# Patient Record
Sex: MALE | Race: WHITE | Employment: FULL TIME | URBAN - METROPOLITAN AREA
[De-identification: names, ages, dates, MRNs, and addresses within clinical notes are randomized per-mention and may not be internally consistent; named-entity substitution may affect disease eponyms.]

---

## 2022-06-06 ENCOUNTER — HOSPITAL ENCOUNTER (OUTPATIENT)
Dept: PREADMISSION TESTING | Age: 57
Discharge: HOME | End: 2022-06-06
Payer: COMMERCIAL

## 2022-06-06 VITALS
TEMPERATURE: 98.4 F | HEART RATE: 100 BPM | HEIGHT: 68 IN | DIASTOLIC BLOOD PRESSURE: 83 MMHG | WEIGHT: 192.46 LBS | SYSTOLIC BLOOD PRESSURE: 164 MMHG | BODY MASS INDEX: 29.17 KG/M2

## 2022-06-06 LAB
ABO + RH BLD: NORMAL
ANION GAP SERPL CALC-SCNC: 3 MMOL/L (ref 5–15)
APPEARANCE UR: CLEAR
ATRIAL RATE: 111 BPM
BACTERIA URNS QL MICRO: NEGATIVE /HPF
BILIRUB UR QL: NEGATIVE
BLOOD GROUP ANTIBODIES SERPL: NORMAL
BUN SERPL-MCNC: 11 MG/DL (ref 6–20)
BUN/CREAT SERPL: 10 (ref 12–20)
CALCIUM SERPL-MCNC: 9 MG/DL (ref 8.5–10.1)
CALCULATED P AXIS, ECG09: 74 DEGREES
CALCULATED R AXIS, ECG10: 62 DEGREES
CALCULATED T AXIS, ECG11: 30 DEGREES
CHLORIDE SERPL-SCNC: 109 MMOL/L (ref 97–108)
CO2 SERPL-SCNC: 30 MMOL/L (ref 21–32)
COLOR UR: NORMAL
CREAT SERPL-MCNC: 1.12 MG/DL (ref 0.7–1.3)
DIAGNOSIS, 93000: NORMAL
EPITH CASTS URNS QL MICRO: NORMAL /LPF
ERYTHROCYTE [DISTWIDTH] IN BLOOD BY AUTOMATED COUNT: 11.4 % (ref 11.5–14.5)
EST. AVERAGE GLUCOSE BLD GHB EST-MCNC: 94 MG/DL
GLUCOSE SERPL-MCNC: 91 MG/DL (ref 65–100)
GLUCOSE UR STRIP.AUTO-MCNC: NEGATIVE MG/DL
HBA1C MFR BLD: 4.9 % (ref 4–5.6)
HCT VFR BLD AUTO: 47.7 % (ref 36.6–50.3)
HGB BLD-MCNC: 15.7 G/DL (ref 12.1–17)
HGB UR QL STRIP: NEGATIVE
HYALINE CASTS URNS QL MICRO: NORMAL /LPF (ref 0–5)
INR PPP: 1 (ref 0.9–1.1)
KETONES UR QL STRIP.AUTO: NEGATIVE MG/DL
LEUKOCYTE ESTERASE UR QL STRIP.AUTO: NEGATIVE
MCH RBC QN AUTO: 35 PG (ref 26–34)
MCHC RBC AUTO-ENTMCNC: 32.9 G/DL (ref 30–36.5)
MCV RBC AUTO: 106.2 FL (ref 80–99)
NITRITE UR QL STRIP.AUTO: NEGATIVE
NRBC # BLD: 0 K/UL (ref 0–0.01)
NRBC BLD-RTO: 0 PER 100 WBC
P-R INTERVAL, ECG05: 132 MS
PH UR STRIP: 5.5 [PH] (ref 5–8)
PLATELET # BLD AUTO: 265 K/UL (ref 150–400)
PMV BLD AUTO: 10.4 FL (ref 8.9–12.9)
POTASSIUM SERPL-SCNC: 3.9 MMOL/L (ref 3.5–5.1)
PROT UR STRIP-MCNC: NEGATIVE MG/DL
PROTHROMBIN TIME: 10.2 SEC (ref 9–11.1)
Q-T INTERVAL, ECG07: 342 MS
QRS DURATION, ECG06: 78 MS
QTC CALCULATION (BEZET), ECG08: 465 MS
RBC # BLD AUTO: 4.49 M/UL (ref 4.1–5.7)
RBC #/AREA URNS HPF: NORMAL /HPF (ref 0–5)
SODIUM SERPL-SCNC: 142 MMOL/L (ref 136–145)
SP GR UR REFRACTOMETRY: 1.02 (ref 1–1.03)
SPECIMEN EXP DATE BLD: NORMAL
UA: UC IF INDICATED,UAUC: NORMAL
UROBILINOGEN UR QL STRIP.AUTO: 0.2 EU/DL (ref 0.2–1)
VENTRICULAR RATE, ECG03: 111 BPM
WBC # BLD AUTO: 2.3 K/UL (ref 4.1–11.1)
WBC URNS QL MICRO: NORMAL /HPF (ref 0–4)

## 2022-06-06 PROCEDURE — 93005 ELECTROCARDIOGRAM TRACING: CPT

## 2022-06-06 PROCEDURE — 85027 COMPLETE CBC AUTOMATED: CPT

## 2022-06-06 PROCEDURE — 80048 BASIC METABOLIC PNL TOTAL CA: CPT

## 2022-06-06 PROCEDURE — 85610 PROTHROMBIN TIME: CPT

## 2022-06-06 PROCEDURE — 86900 BLOOD TYPING SEROLOGIC ABO: CPT

## 2022-06-06 PROCEDURE — 81001 URINALYSIS AUTO W/SCOPE: CPT

## 2022-06-06 PROCEDURE — 83036 HEMOGLOBIN GLYCOSYLATED A1C: CPT

## 2022-06-06 RX ORDER — DULOXETIN HYDROCHLORIDE 30 MG/1
30 CAPSULE, DELAYED RELEASE ORAL EVERY MORNING
COMMUNITY

## 2022-06-06 RX ORDER — ALBUTEROL SULFATE 90 UG/1
AEROSOL, METERED RESPIRATORY (INHALATION) AS NEEDED
COMMUNITY

## 2022-06-06 RX ORDER — IBUPROFEN 200 MG
TABLET ORAL AS NEEDED
COMMUNITY
End: 2022-07-19

## 2022-06-06 RX ORDER — PANTOPRAZOLE SODIUM 40 MG/1
40 TABLET, DELAYED RELEASE ORAL EVERY EVENING
COMMUNITY

## 2022-06-06 RX ORDER — TRAZODONE HYDROCHLORIDE 150 MG/1
150 TABLET ORAL
COMMUNITY

## 2022-06-06 RX ORDER — TOCILIZUMAB 20 MG/ML
4 INJECTION, SOLUTION, CONCENTRATE INTRAVENOUS
Status: ON HOLD | COMMUNITY
End: 2022-07-18 | Stop reason: SDUPTHER

## 2022-06-06 RX ORDER — LISINOPRIL 10 MG/1
10 TABLET ORAL EVERY EVENING
COMMUNITY

## 2022-06-06 RX ORDER — FAMOTIDINE 20 MG/1
20 TABLET, FILM COATED ORAL AS NEEDED
COMMUNITY

## 2022-06-06 RX ORDER — DULOXETIN HYDROCHLORIDE 60 MG/1
60 CAPSULE, DELAYED RELEASE ORAL EVERY EVENING
COMMUNITY

## 2022-06-06 RX ORDER — LEFLUNOMIDE 10 MG/1
10 TABLET ORAL EVERY EVENING
COMMUNITY

## 2022-06-06 RX ORDER — PREDNISONE 5 MG/1
5 TABLET ORAL AS NEEDED
Status: ON HOLD | COMMUNITY
End: 2022-07-18

## 2022-06-06 RX ORDER — LOPERAMIDE HCL 2 MG
2 TABLET ORAL AS NEEDED
COMMUNITY

## 2022-06-06 NOTE — PERIOP NOTES
[address] INSTRUCTIONS  ORTHOPAEDIC    Surgery Date:   7/11/22    Your surgeon's office or [facility] staff will call you between 4 PM- 8 PM the day before surgery with your arrival time. If your surgery is on a Monday, you will receive a call the preceding Friday. 1. Please report to [facility] Patient Access/Admitting on the 1st floor. Bring your insurance card, photo identification, and any copayment (if applicable). 2. If you are going home the same day of your surgery, you must have a responsible adult to drive you home. You need to have a responsible adult to stay with you the first 24 hours after surgery and you should not drive a car for 24 hours following your surgery. 3. Do NOT eat any solid foods after midnight the night before surgery including candy, mints or gum. You may drink clear liquids from midnight until 1 hour prior to arrival time. You may drink up to 12 ounces at one time every 4 hours. 4. Do NOT drink alcohol or smoke 24 hours before surgery. STOP smoking for 14 days prior as it helps with breathing and healing after surgery. 5. If your arrival time is 3pm or later, you may eat a light breakfast before 8am (toast, bagel-no butter, black coffee, plain tea, fruit juice-no pulp) Please note special instructions, if applicable, below for medications. 6. If you are being admitted to the hospital,please leave personal belongings/luggage in your car until you have an assigned hospital room number. 7. Please wear comfortable clothes. Wear your glasses instead of contacts. We ask that all money, jewelry and valuables be left at home. Wear no make up, particularly mascara, the day of surgery. 8.  All body piercings, rings, and jewelry need to be removed and left at home. Please remove any nail polish or artificial nails from your fingernails. Please wear your hair loose or down. Please no pony-tails, buns, or any metal hair accessories.  If you shower the morning of surgery, please do not apply any lotions or powders afterwards. You may wear deodorant. Do not shave any body area within 24 hours of your surgery. 9. Please follow all instructions to avoid any potential surgical cancellation. 10. Should your physical condition change, (i.e. fever, cold, flu, etc.) please notify your surgeon as soon as possible. 11. It is important to be on time. If a situation occurs where you may be delayed, please call:  [phone] / [phone] on the day of surgery. 12. The Preadmission Testing staff can be reached at [phone]. 13. Special instructions: NONE    Current Outpatient Medications   Medication Sig    tocilizumab (Actemra) 80 mg/4 mL (20 mg/mL) soln injection 4 mg/kg by IntraVENous route every month.  leflunomide (Arava) 10 mg tablet Take 10 mg by mouth every evening.  DULoxetine (CYMBALTA) 60 mg capsule Take 60 mg by mouth every evening.  lisinopriL (PRINIVIL, ZESTRIL) 10 mg tablet Take 10 mg by mouth every evening.  DULoxetine (Cymbalta) 30 mg capsule Take 30 mg by mouth Every morning.  traZODone (DESYREL) 150 mg tablet Take 150 mg by mouth nightly.  albuterol (PROVENTIL HFA, VENTOLIN HFA, PROAIR HFA) 90 mcg/actuation inhaler Take  by inhalation as needed for Wheezing.  medical marijuana Take 1 Each by mouth daily as needed for Pain.  pantoprazole (PROTONIX) 40 mg tablet Take 40 mg by mouth every evening.  predniSONE (DELTASONE) 5 mg tablet Take 5 mg by mouth as needed.  loperamide (IMMODIUM) 2 mg tablet Take 2 mg by mouth as needed for Diarrhea.  ibuprofen (MOTRIN) 200 mg tablet Take  by mouth as needed for Pain.  famotidine (PEPCID) 20 mg tablet Take 20 mg by mouth as needed for Heartburn. No current facility-administered medications for this encounter. 1. YOU MUST ONLY TAKE THESE MEDICATIONS THE MORNING OF SURGERY WITH A SIP OF WATER: CYMBALTA, PANTOPRAZOLE.   2. MEDICATIONS TO TAKE THE MORNING OF SURGERY ONLY IF NEEDED: INHALER IF NEEDED  3. HOLD these prescription medications BEFORE Surgery: NONE  4. Ask your surgeon/prescribing physician about when/if to STOP taking these medications: THC  5. Stop any non-steroidal anti-inflammatory drugs (i.e. Ibuprofen, Naproxen, Advil, Aleve) 3 days before surgery. You may take Tylenol. STOP all vitamins and herbal supplements 1 week prior to  Surgery. 6. STOP IBUPROFEN ON 7/8/22  7. If you are currently taking Plavix, Coumadin, or any other blood-thinning/anticoagulant medication contact your prescribing physician for instructions. Preventing Infections Before and After - Your Surgery    IMPORTANT INSTRUCTIONS    You play an important role in your health and preparation for surgery. To reduce the germs on your skin you will need to shower with CHG soap (Chorhexidine gluconate 4%) two times before surgery. CHG soap (Hibiclens, Hex-A-Clens or store brand)   CHG soap will be provided at your Preadmission Testing (PAT) appointment.  If you do not have a PAT appointment before surgery, you may arrange to  CHG soap from our office or purchase CHG soap at a pharmacy, grocery or department store.  You need to purchase TWO 4 ounce bottles to use for your 2 showers. Steps to follow:  1. Wash your hair with your normal shampoo and your body with regular soap and rinse well to remove shampoo and soap from your skin. 2. Wet a clean washcloth and turn off the shower. 3. Put CHG soap on washcloth and apply to your entire body from the neck down. Do not use on your head, face or private parts(genitals). Do not use CHG soap on open sores, wounds or areas of skin irritation. 4. Wash you body gently for 5 minutes. Do not wash your skin too hard. This soap does not create lather. Pay special attention to your underarms and from your belly button to your feet. 5. Turn the shower back on and rinse well to get CHG soap off your body. 6. Pat your skin dry with a clean, dry towel. Do not apply lotions or moisturizer. 7. Put on clean clothes and sleep on fresh bed sheets and do not allow pets to sleep with you. Shower with CHG soap 2 times before your surgery   The evening before your surgery   The morning of your surgery      Tips to help prevent infections after your surgery:  1. Protect your surgical wound from germs:  ? Hand washing is the most important thing you and your caregivers can do to prevent infections. ? Keep your bandage clean and dry! ? Do not touch your surgical wound. 2. Use clean, freshly washed towels and washcloths every time you shower; do not share bath linens with others. 3. Until your surgical wound is healed, wear clothing and sleep on bed linens each day that are clean and freshly washed. 4. Do not allow pets to sleep in your bed with you or touch your surgical wound. 5. Do not smoke - smoking delays wound healing. This may be a good time to stop smoking. 6. If you have diabetes, it is important for you to manage your blood sugar levels properly before your surgery as well as after your surgery. Poorly managed blood sugar levels slow down wound healing and prevent you from healing completely. Prevention of Infection  Testing for Staphylococcus aureus on your skin before surgery    Staphylococcus aureus (staph) is a common bacteria that is found on the body. It normally does not cause infection on healthy skin. Before surgery, you will be tested to see if you have staph by swabbing the inside of your nose. When you have an incision with surgery, the goal is to protect that incision from infection. Removal of the staph bacteria before surgery can decrease the risk of a surgical site infection. If your nose swab is positive for staph you will be called. Your treatment will include 2 steps:   Prescription for Mupirocin ointment to be used in each nostril twice a day for 5 days.    Showering with Chlorhexidine (CHG) liquid soap for 5 days prior to surgery. How to use Mupirocin ointment in your nose  1.  the prescription from your pharmacy. You will receive a large tube of ointment which will be big enough for all of your treatments. You will apply this ointment to each nostril 2 times a day for 5 days. 2. Wash your hands with  gel or soap and water for 20 seconds before using ointment. 3. Place a pea-sized amount of ointment on a cotton Q-tip. 4. Apply ointment just inside of each nostril with the Q-tip. Do not push Q-tip or ointment deep inside you nose. 5. Press your nostrils together and massage for a few seconds. 6. Wash your hands with  gel or soap and water after you are finished. 7. Do not get ointment near your eyes. If it gets into your eyes, rinse them with cool water. 8. If you need to use nasal spray, clean the tip of the bottle with alcohol before use and do not use both at the same time. 9. If you are scheduled for COVID testing during the 5 days, do NOT apply morning dose until after the COVID test has been performed. How to use Chlorhexidine (CHG) 4% liquid soap  1. Purchase an 8 ounce bottle of CHG liquid soap (Chlorhexidine 4%, Hibiclens, Hex-A-Clens or store brand) at a pharmacy or grocery store. 2. Wash your hair with your normal shampoo and your body with regular soap and rinse well to remove shampoo and soap from your skin. 3. Wet a clean washcloth and turn off the shower. 4. Put CHG soap on washcloth and apply to your entire body from the neck down. Do not use on your head, face or private parts(genitals). Do not use CHG soap on open sores, wounds or areas of skin irritation. 5. Wash your body gently for 5 minutes. Do not wash your skin too hard. This soap does not create lather. Pay special attention to your underarms and from your belly button to your feet. 6. Turn the shower back on and rinse well to get CHG soap off your body. 7. Pat your skin dry with a clean, dry towel.  Do not apply lotions or moisturizer. 8. Put on clean clothes and sleep on fresh bed sheets the night before surgery. Do not allow pets to sleep with you. Eating and Drinking Before Surgery     You may eat a regular dinner at the usual time on the day before your surgery.  Do NOT eat any solid foods after midnight unless your arrival time at the hospital is 3pm or later.  You may drink clear liquids only from 12 midnight until 1 hours prior to your arrival time at the hospital on the day of your surgery. Do NOT drink alcohol.  Clear liquids include:  o Water  o Fruit juices without pulp( i.e. apple juice)  o Carbonated beverages  o Black coffee (no cream/milk)  o Tea (no cream/milk)  o Gatorade   You may drink up to 12-16 ounces at one time every 4 hours between the hours of midnight and 1 hour before your arrival time at the hospital. Example- if your arrival time at the hospital is 6am, you may drink 12-16 ounces of clear liquids no later than 5am.   If your arrival time at the hospital is 3pm or later, you may eat a light breakfast before 8am.   A light breakfast includes:  o Toast or bagel (no butter)  o Black coffee (no cream/milk)  o Tea (no cream/milk)  o Fruit juices without pulp ( i.e. apple juice)  o Do NOT eat meat, eggs, vegetables or fruit   If you have any questions, please contact your surgeon's office. Patient Information Regarding COVID Restrictions    Day of Procedure     Please park in the parking deck or any designated visitor parking lot.  Enter the facility through the [facility] of the [facility].   On the day of surgery, please provide the cell phone number of the person who will be waiting for you to the Patient Access representative at the time of registration.  Please wear a mask on the day of your procedure.  We are now allowing two designated visitors per stay. Pediatric patients may have 2 designated visitors.  These two people may come in with you on the day of your procedure.  No visitors under the age of 13.  The designated visitor must also wear a mask.  Once your procedure and the immediate recovery period is completed, a nurse in the recovery area will contact your designated visitor to inform them of your room number or to otherwise review other pertinent information regarding your care.  Social distancing practices are to be adhered to in waiting areas and the cafeteria. The patient was contacted in person. He verbalized understanding of all instructions does not  need reinforcement.

## 2022-06-07 LAB
BACTERIA SPEC CULT: NORMAL
BACTERIA SPEC CULT: NORMAL
SERVICE CMNT-IMP: NORMAL

## 2022-06-08 RX ORDER — MUPIROCIN 20 MG/G
OINTMENT TOPICAL 2 TIMES DAILY
Qty: 22 G | Refills: 0 | Status: SHIPPED | OUTPATIENT
Start: 2022-06-08 | End: 2022-06-13

## 2022-06-08 NOTE — PERIOP NOTES
PAT Nurse Practitioner   Pre-Operative Chart Review/Assessment:-ORTHOPEDIC                Patient Name:  Magdaleno Garg                                                           Age:   62 y.o.    :  1965     Today's Date:  2022     Date of PAT:   2022      Date of Surgery:    2022      Procedure(s):  Right Total Knee Arthroplasty     Surgeon:   Dr. Sarahi Sherman:      1)  Medical Clearance:  Jeyson Gabriel NP      2)  Cardiac Clearance:  EKG and METs reviewed and sent to anesthesia for review. PAT EKG: there are no previous tracings available for comparison, nonspecific ST and T waves changes, sinus tachycardia(111). EKG from 22 reviewed by Dr. David Schneider, anesthesiologist. No previous EKG for comparison. Planned procedure, PMHx, functional status reviewed. Pt ok to proceed with planned procedure per Dr. David Schneider. 3)  Diabetic Treatment Consult:  Not indicated. A1c-4.9      4)  Sleep Apnea evaluation:   Not indicated. OUMOU Score 3.       5) Treatment for MRSA/Staph Aureus:  +MSSA.  Tx w/ mupirocin      6) Additional Concerns:  HTN, GERD, RA, depression                Vital Signs:         Vitals:    22 0833   BP: (!) 164/83   Pulse: 100   Temp: 98.4 °F (36.9 °C)   Weight: 87.3 kg (192 lb 7.4 oz)   Height: 5' 8\" (1.727 m)            ____________________________________________  PAST MEDICAL HISTORY  Past Medical History:   Diagnosis Date    Chronic pain     Depression     GERD (gastroesophageal reflux disease)     Hypertension     Rheumatoid arthritis ([address]       ____________________________________________  PAST SURGICAL HISTORY  Past Surgical History:   Procedure Laterality Date    HX GI      COLONOSCOPY    HX KNEE ARTHROSCOPY Left 2022    HX ORTHOPAEDIC Right     KNEE RECONSTRUCTION    HX ORTHOPAEDIC Right      KNEE REVISION      ____________________________________________  HOME MEDICATIONS  Current Outpatient Medications   Medication Sig  tocilizumab (Actemra) 80 mg/4 mL (20 mg/mL) soln injection 4 mg/kg by IntraVENous route every month.  leflunomide (Arava) 10 mg tablet Take 10 mg by mouth every evening.  DULoxetine (CYMBALTA) 60 mg capsule Take 60 mg by mouth every evening.  lisinopriL (PRINIVIL, ZESTRIL) 10 mg tablet Take 10 mg by mouth every evening.  DULoxetine (Cymbalta) 30 mg capsule Take 30 mg by mouth Every morning.  traZODone (DESYREL) 150 mg tablet Take 150 mg by mouth nightly.  albuterol (PROVENTIL HFA, VENTOLIN HFA, PROAIR HFA) 90 mcg/actuation inhaler Take  by inhalation as needed for Wheezing.  medical marijuana Take 1 Each by mouth daily as needed for Pain.  pantoprazole (PROTONIX) 40 mg tablet Take 40 mg by mouth every evening.  predniSONE (DELTASONE) 5 mg tablet Take 5 mg by mouth as needed.  loperamide (IMMODIUM) 2 mg tablet Take 2 mg by mouth as needed for Diarrhea.  ibuprofen (MOTRIN) 200 mg tablet Take  by mouth as needed for Pain.  famotidine (PEPCID) 20 mg tablet Take 20 mg by mouth as needed for Heartburn. No current facility-administered medications for this encounter.      ____________________________________________  ALLERGIES  Allergies   Allergen Reactions    Penicillins Rash     NO HOSPITALIZATION NEEDED      ____________________________________________  SOCIAL HISTORY  Social History     Tobacco Use    Smoking status: Never Smoker    Smokeless tobacco: Never Used   Substance Use Topics    Alcohol use:  Yes     Alcohol/week: 14.0 standard drinks     Types: 14 Glasses of wine per week      ____________________________________________   Internal Administration   First Dose COVID-19, Rosario Weiner, Primary or Immunocompromised Series, MRNA, PF, 100mcg/0.5mL  02/05/2021   Second Dose COVID-19, Rosario Weiner, Primary or Immunocompromised Series, MRNA, PF, 100mcg/0.5mL  03/05/2021     Labs:     Hospital Outpatient Visit on 06/06/2022   Component Date Value Ref Range Status    Sodium 06/06/2022 142  136 - 145 mmol/L Final    Potassium 06/06/2022 3.9  3.5 - 5.1 mmol/L Final    Chloride 06/06/2022 109* 97 - 108 mmol/L Final    CO2 06/06/2022 30  21 - 32 mmol/L Final    Anion gap 06/06/2022 3* 5 - 15 mmol/L Final    Glucose 06/06/2022 91  65 - 100 mg/dL Final    BUN 06/06/2022 11  6 - 20 MG/DL Final    Creatinine 06/06/2022 1.12  0.70 - 1.30 MG/DL Final    BUN/Creatinine ratio 06/06/2022 10* 12 - 20   Final    GFR est AA 06/06/2022 >60  >60 ml/min/1.73m2 Final    GFR est non-AA 06/06/2022 >60  >60 ml/min/1.73m2 Final    Estimated GFR is calculated using the IDMS-traceable Modification of Diet in Renal Disease (MDRD) Study equation, reported for both  Americans (GFRAA) and non- Americans (GFRNA), and normalized to 1.73m2 body surface area. The physician must decide which value applies to the patient.  Calcium 06/06/2022 9.0  8.5 - 10.1 MG/DL Final    WBC 06/06/2022 2.3* 4.1 - 11.1 K/uL Final    RBC 06/06/2022 4.49  4. 10 - 5.70 M/uL Final    HGB 06/06/2022 15.7  12.1 - 17.0 g/dL Final    HCT 06/06/2022 47.7  36.6 - 50.3 % Final    MCV 06/06/2022 106.2* 80.0 - 99.0 FL Final    MCH 06/06/2022 35.0* 26.0 - 34.0 PG Final    MCHC 06/06/2022 32.9  30.0 - 36.5 g/dL Final    RDW 06/06/2022 11.4* 11.5 - 14.5 % Final    PLATELET 38/72/4124 906  150 - 400 K/uL Final    MPV 06/06/2022 10.4  8.9 - 12.9 FL Final    NRBC 06/06/2022 0.0  0  WBC Final    ABSOLUTE NRBC 06/06/2022 0.00  0.00 - 0.01 K/uL Final    Crossmatch Expiration 06/06/2022 06/20/2022,2359   Final    ABO/Rh(D) 06/06/2022 O POSITIVE   Final    Antibody screen 06/06/2022 NEG   Final    INR 06/06/2022 1.0  0.9 - 1.1   Final    A single therapeutic range for Vit K antagonists may not be optimal for all indications - see June, 2008 issue of Chest, American College of Chest Physicians Evidence-Based Clinical Practice Guidelines, 8th Edition.     Prothrombin time 06/06/2022 10.2  9.0 - 11.1 sec Final    Color 06/06/2022 YELLOW/STRAW    Final    Color Reference Range: Straw, Yellow or Dark Yellow    Appearance 06/06/2022 CLEAR  CLEAR   Final    Specific gravity 06/06/2022 1.019  1.003 - 1.030   Final    pH (UA) 06/06/2022 5.5  5.0 - 8.0   Final    Protein 06/06/2022 Negative  NEG mg/dL Final    Glucose 06/06/2022 Negative  NEG mg/dL Final    Ketone 06/06/2022 Negative  NEG mg/dL Final    Bilirubin 06/06/2022 Negative  NEG   Final    Blood 06/06/2022 Negative  NEG   Final    Urobilinogen 06/06/2022 0.2  0.2 - 1.0 EU/dL Final    Nitrites 06/06/2022 Negative  NEG   Final    Leukocyte Esterase 06/06/2022 Negative  NEG   Final    UA:UC IF INDICATED 06/06/2022 CULTURE NOT INDICATED BY UA RESULT  CNI   Final    WBC 06/06/2022 0-4  0 - 4 /hpf Final    RBC 06/06/2022 0-5  0 - 5 /hpf Final    Epithelial cells 06/06/2022 FEW  FEW /lpf Final    Epithelial cell category consists of squamous cells and /or transitional urothelial cells. Renal tubular cells, if present, are separately identified as such.     Bacteria 06/06/2022 Negative  NEG /hpf Final    Hyaline cast 06/06/2022 0-2  0 - 5 /lpf Final    Ventricular Rate 06/06/2022 111  BPM Final    Atrial Rate 06/06/2022 111  BPM Final    P-R Interval 06/06/2022 132  ms Final    QRS Duration 06/06/2022 78  ms Final    Q-T Interval 06/06/2022 342  ms Final    QTC Calculation (Bezet) 06/06/2022 465  ms Final    Calculated P Axis 06/06/2022 74  degrees Final    Calculated R Axis 06/06/2022 62  degrees Final    Calculated T Axis 06/06/2022 30  degrees Final    Diagnosis 06/06/2022    Final                    Value:Sinus tachycardia  Possible Left atrial enlargement  Nonspecific ST and T wave abnormality  Abnormal ECG  No previous ECGs available  Confirmed by Yessy Cardnoa M.D., Stanley Huang ([id number]) on 6/6/2022 3:27:07 PM      Hemoglobin A1c 06/06/2022 4.9  4.0 - 5.6 % Final    Comment: NEW METHOD  PLEASE NOTE NEW REFERENCE RANGE  (NOTE)  HbA1C Interpretive Ranges  <5.7 Normal  5.7 - 6.4         Consider Prediabetes  >6.5              Consider Diabetes      Est. average glucose 06/06/2022 94  mg/dL Final    Special Requests: 06/06/2022 NO SPECIAL REQUESTS    Final    Culture result: 06/06/2022 MRSA NOT PRESENT. Apparent Staphylococus aureus (not MRSA noted). Final       Skin:     Denies open wounds, cuts, sores, rashes or other areas of concern in PAT assessment.           Ronal Kitchen NP

## 2022-06-08 NOTE — PERIOP NOTES
PC to pt, full name and  verified, regarding positive nasal cx (MSSA) and need to start Mupirocin ointment BID x 5 days to B nostrils starting today and bathe with CHG soap for 5 days prior to surgery. Pt in [facility] until  and will start treatment when he returns. Allergies and pharmacy of choice reviewed. Rx escribed to pt's pharmacy of choice. PTA medlist updated. Surgeon and PCP notified of positive culture and treatment. PRESCRIPTION:    MUPIROCIN 2% OINTMENT  QUANTITY:  #22 GRAMS  REFILLS: NONE    Apply 0.25 g (small pea-sized amount) to both nostrils twice a day for five days.     Dhruv Palomo, NP

## 2022-07-07 RX ORDER — MUPIROCIN 20 MG/G
OINTMENT TOPICAL 2 TIMES DAILY
Qty: 22 G | Refills: 0 | Status: SHIPPED | OUTPATIENT
Start: 2022-07-07 | End: 2022-07-12

## 2022-07-13 NOTE — H&P
Subjective:   Chief Complaint: Pain of the Right Knee and Pain of the Left Knee     HPI: Kyler Acevedo is a 64 y.o. male with rheumatoid arthritis presents today for evaluation and treatment of chronic bilateral knee pain. He has a history of an ACL rupture as a child that was not surgically fixed at the time. Years later he had arthroscopic meniscectomies of his bilateral knees. He eventually had an ACL reconstruction of his right knee with his hamstring autograft and LCL reconstruction in the mid 1990s that subsequently failed. The bilateral knee pain has limited his ability to perform activities of daily living.     Ambulating with no assistance. Currently on biologic for his RA. Objective:      Vitals:      Height: 5' 8\"   Weight: 198 lb   Body mass index is 30.11 kg/m².     Constitutional:  No acute distress. Well nourished. Well developed. Eyes:  Sclera are nonicteric. Respiratory:  No labored breathing. Cardiovascular:  No marked edema. Skin:  No marked skin ulcers. Neurological:  No marked sensory loss noted. Psychiatric: Alert and oriented x3.     Musculoskeletal :  Patient has full range of the bilateral hips with no pain on motion. Bilateral knees:   Full range of motion of the bilateral knees with no significant pain on motion. Positive lachman and mild laxity to valgus stress of the right knee. No pedal edema. Skin healthy and intact. No apparent muscle atrophy.     Radiographs:     Order: XR KNEE 3 VW RIGHT - Indication: Post-traumatic osteoarthritis of  right knee  Order: XR KNEE 3 VW LEFT - Indication: Primary osteoarthritis of left knee        X-ray knee left 3 views ([id number])     Result Date: 2/15/2022  Views: Standing AP, [place], [place].     Impression: Large calcified loose body in the suprapatellar pouch with moderate tricompartmental degenerative changes.   Apparent small metallic clip in the proximal tibia     X-ray knee right 3 views ([id number])     Result Date: 2/15/2022  Views: Standing AP, [place].     Impression: Moderate-severe tricompartmental degenerative changes with calcified loose bodies around the posterior aspect of the knee. Metallic inference screws      Assessment:      1. Post-traumatic osteoarthritis of right knee   2. Primary osteoarthritis of left knee   3. Unilateral primary osteoarthritis, right knee       Plan:   XR of the left knee taken today shows large calcified loose body in the suprapatellar pouch with moderate tricompartmental degenerative changes. Apparent small metallic clip in the proximal tibia     XR of the right knee taken today shows moderate-severe tricompartmental degenerative changes with calcified loose bodies around the posterior aspect of the knee. Metallic inference screws. I discussed the diagnosis of bilateral knee osteoarthritis. We discussed treatment options including continued conservative management vs total knee replacement surgery as well as the pros and cons of each. He reports he has failed conservative management and would like to discuss surgery further. We discussed total knee replacement surgery at length including expected outcomes and post-op recovery as well as risks and complications, specifically DVT, PE, infection, and nerve injury. After a lengthy discussion, the patient desires to proceed with surgery. We will schedule surgery at the patient's convenience. Follow-up for scheduled RIGHT total knee replacement. Surgery should be scheduled at the end of biologic dose regimen.     DATE OF SURGERY UPDATE:  Past Medical History:   Diagnosis Date    Chronic pain     Depression     GERD (gastroesophageal reflux disease)     Hypertension     Rheumatoid arthritis ([address]      Past Surgical History:   Procedure Laterality Date    HX GI      COLONOSCOPY    HX KNEE ARTHROSCOPY Left 04/2022    HX ORTHOPAEDIC Right 1996    KNEE RECONSTRUCTION    HX ORTHOPAEDIC Right 2000     KNEE REVISION     Family History   Problem Relation Age of Onset    No Known Problems Mother     No Known Problems Father     Cancer Sister         BREAST    No Known Problems Brother     Anesth Problems Neg Hx      Social History     Tobacco Use    Smoking status: Never Smoker    Smokeless tobacco: Never Used   Substance Use Topics    Alcohol use: Yes     Alcohol/week: 14.0 standard drinks     Types: 14 Glasses of wine per week      Prior to Admission Medications   Prescriptions Last Dose Informant Patient Reported? Taking? DULoxetine (CYMBALTA) 60 mg capsule 2022 at Unknown time  Yes Yes   Sig: Take 60 mg by mouth every evening. DULoxetine (Cymbalta) 30 mg capsule 2022 at Unknown time  Yes Yes   Sig: Take 30 mg by mouth Every morning. albuterol (PROVENTIL HFA, VENTOLIN HFA, PROAIR HFA) 90 mcg/actuation inhaler 2022 at 0530  Yes Yes   Sig: Take  by inhalation as needed for Wheezing. famotidine (PEPCID) 20 mg tablet 2022 at Unknown time  Yes Yes   Sig: Take 20 mg by mouth as needed for Heartburn. ibuprofen (MOTRIN) 200 mg tablet 2022 at Unknown time  Yes Yes   Sig: Take  by mouth as needed for Pain.   leflunomide (Arava) 10 mg tablet 2022 at Unknown time  Yes Yes   Sig: Take 10 mg by mouth every evening. lisinopriL (PRINIVIL, ZESTRIL) 10 mg tablet 2022 at Unknown time  Yes Yes   Sig: Take 10 mg by mouth every evening. loperamide (IMMODIUM) 2 mg tablet 7/15/2022 at Unknown time  Yes Yes   Sig: Take 2 mg by mouth as needed for Diarrhea.   medical marijuana 2022 at Unknown time  Yes Yes   Sig: Take 1 Each by mouth daily as needed for Pain.   pantoprazole (PROTONIX) 40 mg tablet 2022 at Unknown time  Yes Yes   Sig: Take 40 mg by mouth every evening. tocilizumab (Actemra) 80 mg/4 mL (20 mg/mL) soln injection 2022  Yes No   Si mg/kg by IntraVENous route every month. traZODone (DESYREL) 150 mg tablet 2022 at Unknown time  Yes Yes   Sig: Take 150 mg by mouth nightly. Facility-Administered Medications: None     Allergy to:Penicillins    Kyler Acevedo was seen and examined. Physical Examination: General appearance - alert, well appearing, and in no distress  Chest - clear to auscultation, no wheezes, rales or rhonchi, symmetric air entry  Heart - normal rate, regular rhythm, normal S1, S2, no murmurs, rubs, clicks or gallops  Skin - normal coloration and turgor, no rashes, no suspicious skin lesions noted  History and physical has been reviewed. The patient has been examined.  There have been no significant clinical changes since the completion of the originally dated History and Physical.    Signed By: Suzanna Albarran PA-C     July 18, 2022 7:22 AM

## 2022-07-15 ENCOUNTER — ANESTHESIA EVENT (OUTPATIENT)
Dept: SURGERY | Age: 57
End: 2022-07-15
Payer: COMMERCIAL

## 2022-07-18 ENCOUNTER — HOSPITAL ENCOUNTER (OUTPATIENT)
Age: 57
Discharge: HOME HEALTH | End: 2022-07-19
Attending: ORTHOPAEDIC SURGERY | Admitting: ORTHOPAEDIC SURGERY
Payer: COMMERCIAL

## 2022-07-18 ENCOUNTER — ANESTHESIA (OUTPATIENT)
Dept: SURGERY | Age: 57
End: 2022-07-18
Payer: COMMERCIAL

## 2022-07-18 DIAGNOSIS — Z47.1 AFTERCARE FOLLOWING RIGHT KNEE JOINT REPLACEMENT SURGERY: Primary | ICD-10-CM

## 2022-07-18 DIAGNOSIS — Z96.651 AFTERCARE FOLLOWING RIGHT KNEE JOINT REPLACEMENT SURGERY: Primary | ICD-10-CM

## 2022-07-18 PROBLEM — M17.11 OSTEOARTHRITIS OF RIGHT KNEE: Status: ACTIVE | Noted: 2022-07-18

## 2022-07-18 LAB
ABO + RH BLD: NORMAL
BLOOD GROUP ANTIBODIES SERPL: NORMAL
SPECIMEN EXP DATE BLD: NORMAL

## 2022-07-18 PROCEDURE — 74011000250 HC RX REV CODE- 250: Performed by: ORTHOPAEDIC SURGERY

## 2022-07-18 PROCEDURE — 74011250636 HC RX REV CODE- 250/636: Performed by: ANESTHESIOLOGY

## 2022-07-18 PROCEDURE — C1713 ANCHOR/SCREW BN/BN,TIS/BN: HCPCS | Performed by: ORTHOPAEDIC SURGERY

## 2022-07-18 PROCEDURE — 36415 COLL VENOUS BLD VENIPUNCTURE: CPT

## 2022-07-18 PROCEDURE — 74011000250 HC RX REV CODE- 250: Performed by: PHYSICIAN ASSISTANT

## 2022-07-18 PROCEDURE — 74011250636 HC RX REV CODE- 250/636: Performed by: NURSE ANESTHETIST, CERTIFIED REGISTERED

## 2022-07-18 PROCEDURE — 77030007866 HC KT SPN ANES BBMI -B: Performed by: ANESTHESIOLOGY

## 2022-07-18 PROCEDURE — 77030031139 HC SUT VCRL2 J&J -A: Performed by: ORTHOPAEDIC SURGERY

## 2022-07-18 PROCEDURE — C1776 JOINT DEVICE (IMPLANTABLE): HCPCS | Performed by: ORTHOPAEDIC SURGERY

## 2022-07-18 PROCEDURE — 77030000032 HC CUF TRNQT ZIMM -B: Performed by: ORTHOPAEDIC SURGERY

## 2022-07-18 PROCEDURE — 77030027138 HC INCENT SPIROMETER -A

## 2022-07-18 PROCEDURE — 2709999900 HC NON-CHARGEABLE SUPPLY

## 2022-07-18 PROCEDURE — 2709999900 HC NON-CHARGEABLE SUPPLY: Performed by: ORTHOPAEDIC SURGERY

## 2022-07-18 PROCEDURE — 76060000035 HC ANESTHESIA 2 TO 2.5 HR: Performed by: ORTHOPAEDIC SURGERY

## 2022-07-18 PROCEDURE — 97161 PT EVAL LOW COMPLEX 20 MIN: CPT

## 2022-07-18 PROCEDURE — 74011250637 HC RX REV CODE- 250/637: Performed by: PHYSICIAN ASSISTANT

## 2022-07-18 PROCEDURE — 77030005513 HC CATH URETH FOL11 MDII -B: Performed by: ORTHOPAEDIC SURGERY

## 2022-07-18 PROCEDURE — 77030014077 HC TOWER MX CEM J&J -C: Performed by: ORTHOPAEDIC SURGERY

## 2022-07-18 PROCEDURE — 77030008462 HC STPLR SKN PROX J&J -A: Performed by: ORTHOPAEDIC SURGERY

## 2022-07-18 PROCEDURE — 77030006822 HC BLD SAW SAG BRSM -B: Performed by: ORTHOPAEDIC SURGERY

## 2022-07-18 PROCEDURE — 74011000250 HC RX REV CODE- 250: Performed by: ANESTHESIOLOGY

## 2022-07-18 PROCEDURE — 76010000171 HC OR TIME 2 TO 2.5 HR INTENSV-TIER 1: Performed by: ORTHOPAEDIC SURGERY

## 2022-07-18 PROCEDURE — 77030028907 HC WRP KNEE WO BGS SOLM -B

## 2022-07-18 PROCEDURE — 97530 THERAPEUTIC ACTIVITIES: CPT

## 2022-07-18 PROCEDURE — 74011250636 HC RX REV CODE- 250/636: Performed by: PHYSICIAN ASSISTANT

## 2022-07-18 PROCEDURE — 76210000006 HC OR PH I REC 0.5 TO 1 HR: Performed by: ORTHOPAEDIC SURGERY

## 2022-07-18 PROCEDURE — 97116 GAIT TRAINING THERAPY: CPT

## 2022-07-18 PROCEDURE — 86900 BLOOD TYPING SEROLOGIC ABO: CPT

## 2022-07-18 PROCEDURE — 74011000250 HC RX REV CODE- 250: Performed by: NURSE ANESTHETIST, CERTIFIED REGISTERED

## 2022-07-18 PROCEDURE — 77030035236 HC SUT PDS STRATFX BARB J&J -B: Performed by: ORTHOPAEDIC SURGERY

## 2022-07-18 DEVICE — KNEE K1 TOT HEMI STD CEM IMPL CAPPED K1 ZIM: Type: IMPLANTABLE DEVICE | Status: FUNCTIONAL

## 2022-07-18 DEVICE — IMPLANTABLE DEVICE
Type: IMPLANTABLE DEVICE | Site: KNEE | Status: FUNCTIONAL
Brand: PERSONA®

## 2022-07-18 DEVICE — IMPLANTABLE DEVICE: Type: IMPLANTABLE DEVICE | Site: KNEE | Status: FUNCTIONAL

## 2022-07-18 DEVICE — SMARTSET HV HIGH VISCOSITY BONE CEMENT 40G
Type: IMPLANTABLE DEVICE | Site: KNEE | Status: FUNCTIONAL
Brand: SMARTSET

## 2022-07-18 DEVICE — IMPLANTABLE DEVICE
Type: IMPLANTABLE DEVICE | Site: KNEE | Status: FUNCTIONAL
Brand: PERSONA® VIVACIT-E®

## 2022-07-18 RX ORDER — MORPHINE SULFATE 2 MG/ML
2 INJECTION, SOLUTION INTRAMUSCULAR; INTRAVENOUS
Status: DISCONTINUED | OUTPATIENT
Start: 2022-07-18 | End: 2022-07-18 | Stop reason: HOSPADM

## 2022-07-18 RX ORDER — SODIUM CHLORIDE, SODIUM LACTATE, POTASSIUM CHLORIDE, CALCIUM CHLORIDE 600; 310; 30; 20 MG/100ML; MG/100ML; MG/100ML; MG/100ML
INJECTION, SOLUTION INTRAVENOUS
Status: DISCONTINUED | OUTPATIENT
Start: 2022-07-18 | End: 2022-07-18 | Stop reason: HOSPADM

## 2022-07-18 RX ORDER — ONDANSETRON 4 MG/1
4 TABLET, ORALLY DISINTEGRATING ORAL
Status: DISCONTINUED | OUTPATIENT
Start: 2022-07-18 | End: 2022-07-19 | Stop reason: HOSPADM

## 2022-07-18 RX ORDER — ACETAMINOPHEN 500 MG
1000 TABLET ORAL ONCE
Status: DISCONTINUED | OUTPATIENT
Start: 2022-07-18 | End: 2022-07-18 | Stop reason: HOSPADM

## 2022-07-18 RX ORDER — FAMOTIDINE 20 MG/1
20 TABLET, FILM COATED ORAL EVERY 12 HOURS
Status: DISCONTINUED | OUTPATIENT
Start: 2022-07-18 | End: 2022-07-19

## 2022-07-18 RX ORDER — PANTOPRAZOLE SODIUM 40 MG/1
40 TABLET, DELAYED RELEASE ORAL
Status: DISCONTINUED | OUTPATIENT
Start: 2022-07-18 | End: 2022-07-19 | Stop reason: HOSPADM

## 2022-07-18 RX ORDER — KETOROLAC TROMETHAMINE 30 MG/ML
15 INJECTION, SOLUTION INTRAMUSCULAR; INTRAVENOUS
Status: DISCONTINUED | OUTPATIENT
Start: 2022-07-18 | End: 2022-07-19

## 2022-07-18 RX ORDER — SODIUM CHLORIDE 0.9 % (FLUSH) 0.9 %
5-40 SYRINGE (ML) INJECTION AS NEEDED
Status: DISCONTINUED | OUTPATIENT
Start: 2022-07-18 | End: 2022-07-18 | Stop reason: HOSPADM

## 2022-07-18 RX ORDER — PROCHLORPERAZINE EDISYLATE 5 MG/ML
5 INJECTION INTRAMUSCULAR; INTRAVENOUS
Status: ACTIVE | OUTPATIENT
Start: 2022-07-18 | End: 2022-07-19

## 2022-07-18 RX ORDER — TRANEXAMIC ACID 100 MG/ML
INJECTION, SOLUTION INTRAVENOUS AS NEEDED
Status: DISCONTINUED | OUTPATIENT
Start: 2022-07-18 | End: 2022-07-18 | Stop reason: HOSPADM

## 2022-07-18 RX ORDER — DULOXETIN HYDROCHLORIDE 60 MG/1
60 CAPSULE, DELAYED RELEASE ORAL EVERY EVENING
Status: DISCONTINUED | OUTPATIENT
Start: 2022-07-18 | End: 2022-07-19 | Stop reason: HOSPADM

## 2022-07-18 RX ORDER — POLYETHYLENE GLYCOL 3350 17 G/17G
17 POWDER, FOR SOLUTION ORAL DAILY
Status: DISCONTINUED | OUTPATIENT
Start: 2022-07-18 | End: 2022-07-19 | Stop reason: HOSPADM

## 2022-07-18 RX ORDER — HYDROXYZINE HYDROCHLORIDE 10 MG/1
10 TABLET, FILM COATED ORAL
Status: DISCONTINUED | OUTPATIENT
Start: 2022-07-18 | End: 2022-07-19 | Stop reason: HOSPADM

## 2022-07-18 RX ORDER — CELECOXIB 200 MG/1
200 CAPSULE ORAL ONCE
Status: DISCONTINUED | OUTPATIENT
Start: 2022-07-18 | End: 2022-07-18 | Stop reason: HOSPADM

## 2022-07-18 RX ORDER — OXYCODONE HYDROCHLORIDE 5 MG/1
10 TABLET ORAL
Status: DISCONTINUED | OUTPATIENT
Start: 2022-07-18 | End: 2022-07-19 | Stop reason: HOSPADM

## 2022-07-18 RX ORDER — MELOXICAM 7.5 MG/1
7.5 TABLET ORAL DAILY
Qty: 30 TABLET | Refills: 0 | Status: SHIPPED | OUTPATIENT
Start: 2022-07-18

## 2022-07-18 RX ORDER — ACETAMINOPHEN 325 MG/1
650 TABLET ORAL EVERY 6 HOURS
Status: DISCONTINUED | OUTPATIENT
Start: 2022-07-18 | End: 2022-07-19 | Stop reason: HOSPADM

## 2022-07-18 RX ORDER — SODIUM CHLORIDE, SODIUM LACTATE, POTASSIUM CHLORIDE, CALCIUM CHLORIDE 600; 310; 30; 20 MG/100ML; MG/100ML; MG/100ML; MG/100ML
50 INJECTION, SOLUTION INTRAVENOUS CONTINUOUS
Status: DISCONTINUED | OUTPATIENT
Start: 2022-07-18 | End: 2022-07-18 | Stop reason: HOSPADM

## 2022-07-18 RX ORDER — ALBUTEROL SULFATE 0.83 MG/ML
2.5 SOLUTION RESPIRATORY (INHALATION)
Status: DISCONTINUED | OUTPATIENT
Start: 2022-07-18 | End: 2022-07-19 | Stop reason: HOSPADM

## 2022-07-18 RX ORDER — SODIUM CHLORIDE 0.9 % (FLUSH) 0.9 %
5-40 SYRINGE (ML) INJECTION EVERY 8 HOURS
Status: DISCONTINUED | OUTPATIENT
Start: 2022-07-18 | End: 2022-07-19 | Stop reason: HOSPADM

## 2022-07-18 RX ORDER — TOCILIZUMAB 20 MG/ML
4 INJECTION, SOLUTION, CONCENTRATE INTRAVENOUS
Qty: 1 EACH | Refills: 0 | Status: SHIPPED
Start: 2022-07-18

## 2022-07-18 RX ORDER — PROPOFOL 10 MG/ML
INJECTION, EMULSION INTRAVENOUS AS NEEDED
Status: DISCONTINUED | OUTPATIENT
Start: 2022-07-18 | End: 2022-07-18 | Stop reason: HOSPADM

## 2022-07-18 RX ORDER — DULOXETIN HYDROCHLORIDE 30 MG/1
30 CAPSULE, DELAYED RELEASE ORAL EVERY MORNING
Status: DISCONTINUED | OUTPATIENT
Start: 2022-07-19 | End: 2022-07-19 | Stop reason: HOSPADM

## 2022-07-18 RX ORDER — ROPIVACAINE HYDROCHLORIDE 5 MG/ML
30 INJECTION, SOLUTION EPIDURAL; INFILTRATION; PERINEURAL AS NEEDED
Status: DISCONTINUED | OUTPATIENT
Start: 2022-07-18 | End: 2022-07-18 | Stop reason: HOSPADM

## 2022-07-18 RX ORDER — LISINOPRIL 10 MG/1
10 TABLET ORAL EVERY EVENING
Status: DISCONTINUED | OUTPATIENT
Start: 2022-07-19 | End: 2022-07-19 | Stop reason: HOSPADM

## 2022-07-18 RX ORDER — AMOXICILLIN 250 MG
1 CAPSULE ORAL 2 TIMES DAILY
Status: DISCONTINUED | OUTPATIENT
Start: 2022-07-18 | End: 2022-07-19 | Stop reason: HOSPADM

## 2022-07-18 RX ORDER — ROPIVACAINE HYDROCHLORIDE 5 MG/ML
INJECTION, SOLUTION EPIDURAL; INFILTRATION; PERINEURAL
Status: COMPLETED | OUTPATIENT
Start: 2022-07-18 | End: 2022-07-18

## 2022-07-18 RX ORDER — ONDANSETRON 2 MG/ML
INJECTION INTRAMUSCULAR; INTRAVENOUS AS NEEDED
Status: DISCONTINUED | OUTPATIENT
Start: 2022-07-18 | End: 2022-07-18 | Stop reason: HOSPADM

## 2022-07-18 RX ORDER — LIDOCAINE HYDROCHLORIDE 10 MG/ML
0.1 INJECTION, SOLUTION EPIDURAL; INFILTRATION; INTRACAUDAL; PERINEURAL AS NEEDED
Status: DISCONTINUED | OUTPATIENT
Start: 2022-07-18 | End: 2022-07-18 | Stop reason: HOSPADM

## 2022-07-18 RX ORDER — FENTANYL CITRATE 50 UG/ML
25 INJECTION, SOLUTION INTRAMUSCULAR; INTRAVENOUS
Status: DISCONTINUED | OUTPATIENT
Start: 2022-07-18 | End: 2022-07-18 | Stop reason: HOSPADM

## 2022-07-18 RX ORDER — ONDANSETRON 2 MG/ML
4 INJECTION INTRAMUSCULAR; INTRAVENOUS
Status: ACTIVE | OUTPATIENT
Start: 2022-07-18 | End: 2022-07-19

## 2022-07-18 RX ORDER — HYDROMORPHONE HYDROCHLORIDE 1 MG/ML
0.5 INJECTION, SOLUTION INTRAMUSCULAR; INTRAVENOUS; SUBCUTANEOUS
Status: ACTIVE | OUTPATIENT
Start: 2022-07-18 | End: 2022-07-19

## 2022-07-18 RX ORDER — FENTANYL CITRATE 50 UG/ML
50 INJECTION, SOLUTION INTRAMUSCULAR; INTRAVENOUS AS NEEDED
Status: DISCONTINUED | OUTPATIENT
Start: 2022-07-18 | End: 2022-07-18 | Stop reason: HOSPADM

## 2022-07-18 RX ORDER — SODIUM CHLORIDE 0.9 % (FLUSH) 0.9 %
5-40 SYRINGE (ML) INJECTION AS NEEDED
Status: DISCONTINUED | OUTPATIENT
Start: 2022-07-18 | End: 2022-07-19 | Stop reason: HOSPADM

## 2022-07-18 RX ORDER — NALOXONE HYDROCHLORIDE 0.4 MG/ML
0.4 INJECTION, SOLUTION INTRAMUSCULAR; INTRAVENOUS; SUBCUTANEOUS AS NEEDED
Status: DISCONTINUED | OUTPATIENT
Start: 2022-07-18 | End: 2022-07-19 | Stop reason: HOSPADM

## 2022-07-18 RX ORDER — SODIUM CHLORIDE 0.9 % (FLUSH) 0.9 %
5-40 SYRINGE (ML) INJECTION EVERY 8 HOURS
Status: DISCONTINUED | OUTPATIENT
Start: 2022-07-18 | End: 2022-07-18 | Stop reason: HOSPADM

## 2022-07-18 RX ORDER — PREGABALIN 75 MG/1
75 CAPSULE ORAL ONCE
Status: DISCONTINUED | OUTPATIENT
Start: 2022-07-18 | End: 2022-07-18 | Stop reason: HOSPADM

## 2022-07-18 RX ORDER — OXYCODONE HYDROCHLORIDE 5 MG/1
5 TABLET ORAL
Status: DISCONTINUED | OUTPATIENT
Start: 2022-07-18 | End: 2022-07-19 | Stop reason: HOSPADM

## 2022-07-18 RX ORDER — ONDANSETRON 2 MG/ML
4 INJECTION INTRAMUSCULAR; INTRAVENOUS AS NEEDED
Status: DISCONTINUED | OUTPATIENT
Start: 2022-07-18 | End: 2022-07-18 | Stop reason: HOSPADM

## 2022-07-18 RX ORDER — HYDROMORPHONE HYDROCHLORIDE 1 MG/ML
0.2 INJECTION, SOLUTION INTRAMUSCULAR; INTRAVENOUS; SUBCUTANEOUS
Status: DISCONTINUED | OUTPATIENT
Start: 2022-07-18 | End: 2022-07-18 | Stop reason: HOSPADM

## 2022-07-18 RX ORDER — LEFLUNOMIDE 10 MG/1
10 TABLET ORAL EVERY EVENING
Status: DISCONTINUED | OUTPATIENT
Start: 2022-07-18 | End: 2022-07-19 | Stop reason: HOSPADM

## 2022-07-18 RX ORDER — OXYCODONE HYDROCHLORIDE 5 MG/1
5 TABLET ORAL
Qty: 40 TABLET | Refills: 0 | Status: SHIPPED | OUTPATIENT
Start: 2022-07-18 | End: 2022-07-19 | Stop reason: SDUPTHER

## 2022-07-18 RX ORDER — MIDAZOLAM HYDROCHLORIDE 1 MG/ML
INJECTION, SOLUTION INTRAMUSCULAR; INTRAVENOUS AS NEEDED
Status: DISCONTINUED | OUTPATIENT
Start: 2022-07-18 | End: 2022-07-18 | Stop reason: HOSPADM

## 2022-07-18 RX ORDER — SODIUM CHLORIDE 9 MG/ML
125 INJECTION, SOLUTION INTRAVENOUS CONTINUOUS
Status: DISPENSED | OUTPATIENT
Start: 2022-07-18 | End: 2022-07-19

## 2022-07-18 RX ORDER — BUPIVACAINE HYDROCHLORIDE 7.5 MG/ML
INJECTION, SOLUTION EPIDURAL; RETROBULBAR
Status: COMPLETED | OUTPATIENT
Start: 2022-07-18 | End: 2022-07-18

## 2022-07-18 RX ORDER — FACIAL-BODY WIPES
10 EACH TOPICAL DAILY PRN
Status: DISCONTINUED | OUTPATIENT
Start: 2022-07-20 | End: 2022-07-19 | Stop reason: HOSPADM

## 2022-07-18 RX ORDER — MIDAZOLAM HYDROCHLORIDE 1 MG/ML
1 INJECTION, SOLUTION INTRAMUSCULAR; INTRAVENOUS AS NEEDED
Status: DISCONTINUED | OUTPATIENT
Start: 2022-07-18 | End: 2022-07-18 | Stop reason: HOSPADM

## 2022-07-18 RX ORDER — LIDOCAINE HYDROCHLORIDE 20 MG/ML
INJECTION, SOLUTION EPIDURAL; INFILTRATION; INTRACAUDAL; PERINEURAL AS NEEDED
Status: DISCONTINUED | OUTPATIENT
Start: 2022-07-18 | End: 2022-07-18 | Stop reason: HOSPADM

## 2022-07-18 RX ADMIN — OXYCODONE 5 MG: 5 TABLET ORAL at 12:57

## 2022-07-18 RX ADMIN — MIDAZOLAM 2 MG: 1 INJECTION INTRAMUSCULAR; INTRAVENOUS at 07:29

## 2022-07-18 RX ADMIN — SODIUM CHLORIDE, POTASSIUM CHLORIDE, SODIUM LACTATE AND CALCIUM CHLORIDE 50 ML/HR: 600; 310; 30; 20 INJECTION, SOLUTION INTRAVENOUS at 07:16

## 2022-07-18 RX ADMIN — OXYCODONE 5 MG: 5 TABLET ORAL at 13:10

## 2022-07-18 RX ADMIN — LEFLUNOMIDE 10 MG: 10 TABLET, FILM COATED ORAL at 20:08

## 2022-07-18 RX ADMIN — DOCUSATE SODIUM 50MG AND SENNOSIDES 8.6MG 1 TABLET: 8.6; 5 TABLET, FILM COATED ORAL at 12:57

## 2022-07-18 RX ADMIN — PANTOPRAZOLE SODIUM 40 MG: 40 TABLET, DELAYED RELEASE ORAL at 17:30

## 2022-07-18 RX ADMIN — FENTANYL CITRATE 50 MCG: 50 INJECTION, SOLUTION INTRAMUSCULAR; INTRAVENOUS at 07:29

## 2022-07-18 RX ADMIN — LIDOCAINE HYDROCHLORIDE 80 MG: 20 INJECTION, SOLUTION EPIDURAL; INFILTRATION; INTRACAUDAL; PERINEURAL at 07:47

## 2022-07-18 RX ADMIN — WATER 2 G: 1 INJECTION INTRAMUSCULAR; INTRAVENOUS; SUBCUTANEOUS at 08:06

## 2022-07-18 RX ADMIN — BUPIVACAINE HYDROCHLORIDE 10 MG: 7.5 INJECTION, SOLUTION EPIDURAL; RETROBULBAR at 07:50

## 2022-07-18 RX ADMIN — DOCUSATE SODIUM 50MG AND SENNOSIDES 8.6MG 1 TABLET: 8.6; 5 TABLET, FILM COATED ORAL at 20:07

## 2022-07-18 RX ADMIN — PROPOFOL 50 MCG/KG/MIN: 10 INJECTION, EMULSION INTRAVENOUS at 07:49

## 2022-07-18 RX ADMIN — SODIUM CHLORIDE, POTASSIUM CHLORIDE, SODIUM LACTATE AND CALCIUM CHLORIDE: 600; 310; 30; 20 INJECTION, SOLUTION INTRAVENOUS at 07:32

## 2022-07-18 RX ADMIN — FAMOTIDINE 20 MG: 20 TABLET ORAL at 12:57

## 2022-07-18 RX ADMIN — WATER 2 G: 1 INJECTION INTRAMUSCULAR; INTRAVENOUS; SUBCUTANEOUS at 17:29

## 2022-07-18 RX ADMIN — ROPIVACAINE HYDROCHLORIDE 30 ML: 5 INJECTION, SOLUTION EPIDURAL; INFILTRATION; PERINEURAL at 07:25

## 2022-07-18 RX ADMIN — LIDOCAINE HYDROCHLORIDE 20 MG: 20 INJECTION, SOLUTION EPIDURAL; INFILTRATION; INTRACAUDAL; PERINEURAL at 08:57

## 2022-07-18 RX ADMIN — OXYCODONE 10 MG: 5 TABLET ORAL at 17:30

## 2022-07-18 RX ADMIN — TRAZODONE HYDROCHLORIDE 150 MG: 100 TABLET ORAL at 22:27

## 2022-07-18 RX ADMIN — POLYETHYLENE GLYCOL 3350 17 G: 17 POWDER, FOR SOLUTION ORAL at 12:57

## 2022-07-18 RX ADMIN — ACETAMINOPHEN 650 MG: 325 TABLET ORAL at 20:07

## 2022-07-18 RX ADMIN — OXYCODONE 10 MG: 5 TABLET ORAL at 20:07

## 2022-07-18 RX ADMIN — ACETAMINOPHEN 650 MG: 325 TABLET ORAL at 12:57

## 2022-07-18 RX ADMIN — ONDANSETRON HYDROCHLORIDE 4 MG: 2 INJECTION, SOLUTION INTRAMUSCULAR; INTRAVENOUS at 07:47

## 2022-07-18 RX ADMIN — MIDAZOLAM 2 MG: 1 INJECTION INTRAMUSCULAR; INTRAVENOUS at 07:47

## 2022-07-18 RX ADMIN — SODIUM CHLORIDE 125 ML/HR: 9 INJECTION, SOLUTION INTRAVENOUS at 18:32

## 2022-07-18 RX ADMIN — SODIUM CHLORIDE, PRESERVATIVE FREE 10 ML: 5 INJECTION INTRAVENOUS at 22:00

## 2022-07-18 RX ADMIN — DULOXETINE HYDROCHLORIDE 60 MG: 60 CAPSULE, DELAYED RELEASE ORAL at 20:07

## 2022-07-18 RX ADMIN — PROPOFOL 50 MG: 10 INJECTION, EMULSION INTRAVENOUS at 07:47

## 2022-07-18 NOTE — PROGRESS NOTES
Problem: Mobility Impaired (Adult and Pediatric)  Goal: *Acute Goals and Plan of Care (Insert Text)  Description: FUNCTIONAL STATUS PRIOR TO ADMISSION: Patient was independent and active without use of DME.    HOME SUPPORT PRIOR TO ADMISSION: The patient lived with wife but did not require assist.    Physical Therapy Goals  Initiated 7/18/2022    1. Patient will move from supine to sit and sit to supine , scoot up and down, and roll side to side in bed with modified independence within 4 days. 2. Patient will perform sit to stand with modified independence within 4 days. 3. Patient will ambulate with modified independence for 150 feet with the least restrictive device within 4 days. 4. Patient will ascend/descend 4 stairs with cane and one handrail(s) with modified independence within 4 days. 5. Patient will perform home exercise program per protocol with independence within 4 days. 6. Patient will demonstrate AROM 0-90 degrees in operative joint within 4 days. Outcome: Progressing Towards Goal   PHYSICAL THERAPY EVALUATION  Patient: Yaneth Jiménez (47 y.o. male)  Date: 7/18/2022  Primary Diagnosis: Primary osteoarthritis of right knee [M17.11]  Osteoarthritis of right knee [M17.11]  Procedure(s) (LRB):  RIGHT TOTAL KNEE REPLACEMENT (Right) Day of Surgery   Precautions:   Fall,WBAT    ASSESSMENT  Based on the objective data described below, the patient presents with  impairment in functional mobility, activity tolerance and balance s/p R TKA. PLOF: Independent with ADLs and IADLs. Patient lives with wife in a one story home with 3 steps/right rail to enter. Patient's mobility was on target for POD#0. Will address more exercises, increase gait distance, negotiate stairs and assess for discharge at am PT session tomorrow. Patient instructed NOT to get up from bed, chair or commode without calling for assistance. Initiated post TKA exercise protocol and wrote same on [employer]. Anticipate discharge after am PT session tomorrow. Current Level of Function Impacting Discharge (mobility/balance): Stand by assistance for all mobility; ambulated 65 ft with RW and gait belt, steady. Functional Outcome Measure: The patient scored 55/100 on the Barthel outcome measure which is indicative of moderate impaired ability to care for basic self-needs/dependency on others. .      Other factors to consider for discharge: Motivated/A & O x 4/Supportive Family/Independent PLOF      Patient will benefit from skilled therapy intervention to address the above noted impairments. PLAN :  Recommendations and Planned Interventions: bed mobility training, transfer training, gait training, therapeutic exercises, patient and family training/education, and therapeutic activities      Frequency/Duration: Patient will be followed by physical therapy:  2 times a week to address goals. Recommendation for discharge: (in order for the patient to meet his/her long term goals)  Physical therapy at least 2 days/week in the home     This discharge recommendation:  Has been made in collaboration with the attending provider and/or case management    IF patient discharges home will need the following DME: patient owns DME required for discharge         SUBJECTIVE:   Patient stated I am ready to have pain medication.     OBJECTIVE DATA SUMMARY:   HISTORY:    Past Medical History:   Diagnosis Date    Chronic pain     Depression     GERD (gastroesophageal reflux disease)     Hypertension     Rheumatoid arthritis ([facility] [address]      Past Surgical History:   Procedure Laterality Date    HX GI      COLONOSCOPY    HX KNEE ARTHROSCOPY Left 04/2022    HX ORTHOPAEDIC Right 1996    KNEE RECONSTRUCTION    HX ORTHOPAEDIC Right 2000     KNEE REVISION       Personal factors and/or comorbidities impacting plan of care:  Motivated/A & O x 4/Supportive Family/Independent PLOF     Home Situation  Home Environment: Private residence  # Steps to Enter: 3  Rails to Enter: Yes  Hand Rails : Right  Wheelchair Ramp: No  One/Two Story Residence: One story  Living Alone: No  Support Systems: Spouse/Significant Other  Patient Expects to be Discharged to[de-identified] Home with home health  Current DME Used/Available at Home: Cane, straight,Crutches,Walker, rolling  Tub or Shower Type: Shower    EXAMINATION/PRESENTATION/DECISION MAKING:   Critical Behavior:  Neurologic State: Alert           Hearing:     Skin:  Ace Wrap Intact with no drainage appreciated. Edema: Normal post-op inflammation   Range Of Motion:  AROM: Generally decreased, functional           PROM: Generally decreased, functional           Strength:    Strength: Generally decreased, functional                    Tone & Sensation:   Tone: Normal              Sensation: Intact               Coordination:  Coordination: Within functional limits  Vision:      Functional Mobility:  Bed Mobility:     Supine to Sit: Stand-by assistance  Sit to Supine: Stand-by assistance  Scooting: Stand-by assistance  Transfers:  Sit to Stand: Stand-by assistance  Stand to Sit: Stand-by assistance                       Balance:   Sitting: Intact  Standing: Intact; With support  Ambulation/Gait Training:  Distance (ft): 65 Feet (ft)  Assistive Device: Walker, rolling;Gait belt  Ambulation - Level of Assistance: Stand-by assistance        Gait Abnormalities: Antalgic  Right Side Weight Bearing: As tolerated     Base of Support: Shift to left        Therapeutic Exercises: Ankle Pumps  Ham Sets  Quad Sets  Heel Slides  X 10 each, 5-7x daily     Functional Measure:  Barthel Index:    Bathin  Bladder: 10  Bowels: 10  Groomin  Dressin  Feeding: 10  Mobility: 0  Stairs: 0  Toilet Use: 5  Transfer (Bed to Chair and Back): 10  Total: 55/100       The Barthel ADL Index: Guidelines  1. The index should be used as a record of what a patient does, not as a record of what a patient could do.   2. The main aim is to establish degree of independence from any help, physical or verbal, however minor and for whatever reason. 3. The need for supervision renders the patient not independent. 4. A patient's performance should be established using the best available evidence. Asking the patient, friends/relatives and nurses are the usual sources, but direct observation and common sense are also important. However direct testing is not needed. 5. Usually the patient's performance over the preceding 24-48 hours is important, but occasionally longer periods will be relevant. 6. Middle categories imply that the patient supplies over 50 per cent of the effort. 7. Use of aids to be independent is allowed. Score Interpretation (from [address] [address])    Independent   60-79 Minimally independent   40-59 Partially dependent   20-39 Very dependent   <20 Totally dependent     -Carter Sahu., Barthel, DSamW. (1965). Functional evaluation: the Barthel Index. [facility] ([address] (1997). The Barthel activities of daily living index: self-reporting versus actual performance in the old (> or = 75 years). Journal [address](7), [facility], J.J.M.F, Jenniffer [facility]., [facility]. (1999). Measuring the change in disability after inpatient rehabilitation; comparison of the responsiveness of the Barthel Index and Functional [place]uthrie Measure. Journal of Neurology, Neurosurgery, and Psychiatry, 66(4), 261-079. Rafaela Coon NGIULIA.DEBRA, ANNE Olivares, & Cheryle Taylor, M.A. (2004) Assessment of post-stroke quality of life in cost-effectiveness studies: The usefulness of the Barthel Index and the EuroQoL-5D.  Quality of Life Research, 15, 162-66          Physical Therapy Evaluation Charge Determination   History Examination Presentation Decision-Making   LOW Complexity : Zero comorbidities / personal factors that will impact the outcome / POC LOW Complexity : 1-2 Standardized tests and measures addressing body structure, function, activity limitation and / or participation in recreation  LOW Complexity : Stable, uncomplicated  LOW Complexity : FOTO score of       Based on the above components, the patient evaluation is determined to be of the following complexity level: LOW     Pain Ratin/10    Activity Tolerance:   Good   Vitals:    22 1030 22 1054 22 1156 22 1249   BP:  133/79 (!) 142/91 (!) 148/76   BP 1 Location:  Left upper arm Left upper arm Left upper arm   BP Patient Position:  At rest At rest At rest;Semi fowlers   Pulse: 66 68 60 76   Temp:  97.6 °F (36.4 °C) 97.8 °F (36.6 °C) 97.8 °F (36.6 °C)   Resp: 10 16 16 16   Height:       Weight:       SpO2: 100% 98% 96% 99%       After treatment patient left in no apparent distress:   Supine in bed, Call bell within reach, Side rails x 3, and nurse notified. COMMUNICATION/EDUCATION:   The patients plan of care was discussed with: Registered nurse, Physician, and Case management. Fall prevention education was provided and the patient/caregiver indicated understanding., Patient/family have participated as able in goal setting and plan of care. , and Patient/family agree to work toward stated goals and plan of care.     Thank you for this referral.  Oscar Tamez   Time Calculation: 40 mins

## 2022-07-18 NOTE — ANESTHESIA PROCEDURE NOTES
Spinal Block    Start time: 7/18/2022 7:40 AM  End time: 7/18/2022 7:50 AM  Performed by: Fidelia Dye MD  Authorized by: Fidelia Dye MD     Pre-procedure:   Indications: primary anesthetic  Preanesthetic Checklist: patient identified, risks and benefits discussed, anesthesia consent, site marked, patient being monitored, timeout performed and fire risk safety assessment completed and verbalized    Timeout Time: 07:40 EDT          Spinal Block:   Patient Position:  Seated  Prep Region:  Lumbar  Prep: Betadine      Location:  L2-3  Technique:  Single shot  Local: bupivacaine (PF) (MARCAINE) 0.75 % (7.5 mg/mL) Intrathecal, 10 mg    Med Admin Time: 7/18/2022 7:50 AM    Needle:     Needle Gauge:  24 G  Attempts:  1      Events: CSF confirmed, no blood with aspiration and no paresthesia        Assessment:  Insertion:  Uncomplicated  Patient tolerance:  Patient tolerated the procedure well with no immediate complications

## 2022-07-18 NOTE — PERIOP NOTES
OR desk Agnieszka lFoyd made aware of patient's COVID lab results x 2. Radha made aware via  Agnieszka Floyd, ok to proceed.

## 2022-07-18 NOTE — PERIOP NOTES
TRANSFER - OUT REPORT:    Verbal report given to [address] RN(name) on [facility]  being transferred to [facility](unit) for routine post - op       Report consisted of patients Situation, Background, Assessment and   Recommendations(SBAR). Time Pre op antibiotic given:0802  Anesthesia Stop time: 4993  [place] Present on Transfer to floor:n/a      Information from the following report(s) SBAR, Procedure Summary, Intake/Output and MAR was reviewed with the receiving nurse. Opportunity for questions and clarification was provided. Is the patient on 02? NO           Is the patient on a monitor? NO    Is the nurse transporting with the patient? NO    Surgical Waiting Area notified of patient's transfer from PACU? YES      The following personal items collected during your admission accompanied patient upon transfer:   Dental Appliance: Dental Appliances: None  Vision: Visual Aid: Glasses (Glasses in PACU)  Hearing Aid:    Jewelry:    Clothing:    Other Valuables:    Valuables sent to safe:      Eye glasses and belongings sent to room with patient.

## 2022-07-18 NOTE — ANESTHESIA PREPROCEDURE EVALUATION
Relevant Problems   No relevant active problems       Anesthetic History   No history of anesthetic complications            Review of Systems / Medical History  Patient summary reviewed, nursing notes reviewed and pertinent labs reviewed    Pulmonary  Within defined limits                 Neuro/Psych   Within defined limits           Cardiovascular    Hypertension: well controlled              Exercise tolerance: >4 METS     GI/Hepatic/Renal     GERD           Endo/Other        Arthritis     Other Findings              Physical Exam    Airway  Mallampati: I  TM Distance: > 6 cm  Neck ROM: normal range of motion   Mouth opening: Normal     Cardiovascular    Rhythm: regular           Dental  No notable dental hx       Pulmonary  Breath sounds clear to auscultation               Abdominal         Other Findings            Anesthetic Plan    ASA: 2  Anesthesia type: spinal          Induction: Intravenous  Anesthetic plan and risks discussed with: Patient

## 2022-07-18 NOTE — DISCHARGE INSTRUCTIONS
Discharge Instructions Knee Replacement  Dr. Concepcion Harrell      Patient Name  Sherie Para  Date of procedure  7/18/2022    Procedure  Procedure(s):  RIGHT TOTAL KNEE REPLACEMENT  Surgeon  Surgeon(s) and Role:     * Noemy Dugan MD - Primary  Date of discharge: No discharge date for patient encounter. PCP: Rosario Corral NP    Follow up care   Follow up visit with Dr. Concepcion Harrell in 2 weeks. Call [phone] to make an appointment   The staples in your knee incision will be taken out at this follow up appointment    Activity at home   Take a short walk every hour; except at night when sleeping   Do your Home Exercise Program 3 times every day    After exercising lie down and elevate your leg on pillows for 15-30 minutes to decrease swelling   Refer to your patient notebook for more information  Bathing and caring for your incision   Change your knee dressing daily for one week. You may then leave your incision open to air unless you see drainage from your knee. o Use a dry dressing which consists of either sterile gauze or ABD pads. You can tape it on to your knee or use a wrap to secure it to the incision. o Do not apply lotions, ointments, or other products over the incision until it is completely healed.  You may take a shower and wash your incision with soap and water starting on the 3rd day after surgery.  Do not submerge the incision under water until your incision is completely healed (bath tub, hot tub, swimming pool, etc.)     Preventing blood clots   Take Eliquis 2.5mg twice a day as prescribed for 14 days after surgery.  Call Dr. Concepcion Harrell if you have side effects of blood thinning medication: bleeding, bruising, upset stomach, or diarrhea.     Call Dr. Concepcion Harrell for signs of a blood clot in your leg: calf pain, tenderness, redness, swelling of lower leg   Preventing lung congestion   Use your incentive spirometer 4 times a day; do 10 repetitions each time   Remember to keep the small blue ball between the two arrows when taking a slow, deep breath   Pain Management   Get up and walk a short distance to relieve pain and stiffness.  Place ice wrap on your knee except when you are walking. The gel ice packs should be changed about every 4 hours   Elevate your leg on pillows for 15-30 minutes    Take Tylenol 1000mg (take two 500mg tablets) every 6-8 hours for the next 2 weeks   Do not take any other anti-inflammatories (Aleve, Advil) besides the one you were prescribed (meloxicam). You may begin taking Advil/Aleve as needed for pain instead of the meloxicam AFTER you are finished with the blood thinner if you prefer.  If needed, take a narcotic pain pill every 4-6 hours as prescribed. Take with a small amount of food.  As your pain decreases, take the narcotics less often or take ½ of a pill   Call Dr. Reza Ortiz if you have side effects from your narcotic pain medication: itching, drowsiness, dizziness, upset stomach, dry mouth, constipation or if you medication is not relieving your pain. Diet after surgery   You may resume your normal diet. Include vegetables, fruit, whole grains, lean meats, and low-fat dairy products. Eat food high in fiber    Drink plenty of fluids, including 8 cups of water daily   Take over-the-counter stool softeners and laxatives to prevent constipation    Avoid after surgery   DO NOT TAKE YOUR ACTEMRA UNTIL THE INCISION IS COMPLETELY HEALED (USUALLY AROUND 3 WEEKS)   Do not take any over-the-counter medication for pain except Tylenol     Do not take more than 4000 mg (4 Grams) of Tylenol in 24 hours     Do not drink alcoholic beverages   Do not smoke   Do not drive until seen for follow up appointment   Do not place frozen gel pack directly on your skin. It can cause frostbite.    Prevention of falls and safety at home   Set up an area where you can rest comfortably leaving space around furniture to allow you to walk with your walker   Keep stairs, hallways and bathrooms well lit; especially at night   Arrange for care for your pets   Keep your home free of clutter   Call Dr. Amanda Grant at [phone] for:   Pain that is not relieved by pain medication, ice and activity   Side effects of medications   Increased/spread of bruising   Warning signs of infection:  ? persistent fever greater than 100 degrees  ? shaking or chills  ? increased redness, tenderness, swelling or drainage from incision  ? increased pain during activity or rest   Warning signs of a blood clot in your leg:  ? increased pain in your calf  ? tenderness or redness  ? increased swelling of knee, calf, ankle, or foot    If you call after 5pm or on a weekend, the on call physician will return your phone call  Call your Primary Care Doctor for:    Concerns about your medical conditions such as diabetes, high blood pressure, asthma, congestive heart failure.    Blood sugars greater than 180   Persistent headache or dizziness   Coughing or congestion   Constipation or diarrhea   Burning when you go to the bathroom   Abnormal heart rate (fast or slow)               Call 911 and go to the nearest hospital for:    Sudden increased shortness of breath   Sudden onset of chest pain   Difficulty breathing   Localized chest pain with coughing or taking a deep breath

## 2022-07-18 NOTE — ANESTHESIA POSTPROCEDURE EVALUATION
Post-Anesthesia Evaluation and Assessment    Patient: Rickey Kearsn MRN: [id number]  SSN: [id number]    YOB: 1965  Age: 62 y.o. Sex: male      I have evaluated the patient and they are stable and ready for discharge from the PACU. Cardiovascular Function/Vital Signs  Visit Vitals  /72   Pulse 72   Temp 36.4 °C (97.6 °F)   Resp 16   Ht 5' 8\" (1.727 m)   Wt 86.2 kg (190 lb)   SpO2 100%   BMI 28.89 kg/m²       Patient is status post General anesthesia for Procedure(s):  RIGHT TOTAL KNEE REPLACEMENT. Nausea/Vomiting: None    Postoperative hydration reviewed and adequate. Pain:  Pain Scale 1: Numeric (0 - 10) (07/18/22 6072)  Pain Intensity 1: 0 (07/18/22 4062)   Managed    Neurological Status:   Neuro (WDL): Within Defined Limits (07/18/22 5652)   At baseline    Mental Status, Level of Consciousness: Alert and  oriented to person, place, and time    Pulmonary Status:   O2 Device: CO2 nasal cannula (07/18/22 0951)   Adequate oxygenation and airway patent    Complications related to anesthesia: None    Post-anesthesia assessment completed. No concerns    Signed By: Carla Severs, MD     July 18, 2022              Procedure(s):  RIGHT TOTAL KNEE REPLACEMENT.    spinal    <BSHSIANPOST>    INITIAL Post-op Vital signs:   Vitals Value Taken Time   /72 07/18/22 1000   Temp 36.4 °C (97.6 °F) 07/18/22 0951   Pulse 65 07/18/22 1005   Resp 11 07/18/22 1005   SpO2 100 % 07/18/22 1005   Vitals shown include unvalidated device data.

## 2022-07-18 NOTE — OP NOTES
Operative Note    7/18/2022  OPERATIVE REPORT      PREOPERATIVE DIAGNOSIS: Osteoarthritis, right knee with retained hardware    POSTOPERATIVE DIAGNOSIS: Osteoarthritis, right knee with retained hardware    OPERATIVE PROCEDURE: right total knee replacement with removal of hardware    SURGEON: Sienna Mattson MD    ASSISTANT SURGEON: Brennon Beltran, [facility]    ANESTHESIA: Spinal.    Antibiotic:Ancef    INDICATIONS: : A 62 y.o.  male  with end stage osteoarthritis to the right knee who is S/P ACL reconstruction, not responsive to conservative management. COMPLICATIONS:None    PROCEDURE: The patient was taken to the operating room, underwent  placement of spinal anesthesia. The knee and leg were prepped and  draped in the usual fashion. The leg was exsanguinated with the Esmarch  bandage and tourniquet inflated to 350 mmHg. A longitudinal midline  incision made down through skin and subcutaneous tissue. A medial  parapatellar arthrotomy was performed, and extended proximally along the  medial border of the quadriceps tendon, distally along the medial border of  the insertion of the patella tendon. Medial release was performed. Retropatellar fat pad was sharply excised. The patella was subluxated  laterally, the knee was flexed to 90 degrees. Drill was used to enter the  intramedullary canal of the distal femur. The 5 degree intramedullary guide  was applied and the distal femoral cut was performed. The femur was sized  to a 8. A 8 cutting block was applied, and the anterior, posterior,  chamfer cuts were performed. The extramedullary tibial guide was applied, and the tibia was cut in  neutral alignment. The tibia was sized to a G. Knee was balanced to varus  and valgus stress. Flexion and extension gaps were equal. Trial reduction  was performed, using 10 mm insert. Excellent range of motion and stability  were noted. The patella was everted, and the patella was cut using freehand  technique. Patella was sized to a 38. Drill holes were placed, and patella  button applied. The patella tracked normally. All trial components were  removed, and surfaces were prepared using drill and punch. The tibial interference screw was in the way so I removed it with an osteotome. All residual  meniscal tissue was sharply excised. Hemostasis was obtained using Bovie  apparatus. All components were cemented in place, taking care to remove all  excess cement. The knee was maintained in full extension while the cement  hardened. The tourniquet was let down after a total tourniquet time of 52  minutes. Hemostasis was obtained using the Bovie apparatus. The joint was  extensively irrigated with antibiotic solution. The arthrotomy was repaired  using #2 Vicryl in interrupted figure-of-eight fashion. Subcutaneous tissue  was approximated using 2-0 Vicryl in interrupted fashion. Skin edges were  approximated using stapling apparatus. Joint was infiltrated with 30 mL of  0.5% Marcaine with epinephrine. Bulky sterile dressing was applied, and the  patient was transferred to recovery room in stable condition. There were no  Complications. The Physician Assistant was critical during the procedure by assisting with positioning of the leg, retraction, hemostasis, and wound closure. ESTIMATED BLOOD LOSS: 100 cc.     SPECIMEN: Samy Ball M.D.  7/18/2022  9:23 AM

## 2022-07-18 NOTE — BRIEF OP NOTE
Brief Postoperative Note    Patient: Sera Cervantes  YOB: 1965  MRN: [id number]    Date of Procedure: 7/18/2022     Pre-Op Diagnosis: Primary osteoarthritis of right knee [M17.11]    Post-Op Diagnosis: Same as preoperative diagnosis. Procedure(s):  RIGHT TOTAL KNEE REPLACEMENT with removal of tibial screw    Surgeon(s):  Veda Le MD    Surgical Assistant: Physician Assistant: Brian Duran PA-C  Surg Asst-1: Christen Kaye    Anesthesia: General     Estimated Blood Loss (mL): less than 125     Complications: None    Specimens: * No specimens in log *     Implants:   Implant Name Type Inv.  Item Serial No.  Lot No. LRB No. Used Action   CEMENT BNE 40GM FULL DOSE PMMA W/O ANTIBIO HI VISC N RADPQ - SNA  CEMENT BNE 40GM FULL DOSE PMMA W/O ANTIBIO HI VISC N RADPQ NA [facility] [id number] Right 1 Implanted   CEMENT BNE 40GM FULL DOSE PMMA W/O ANTIBIO HI VISC N RADPQ - SNA  CEMENT BNE 40GM FULL DOSE PMMA W/O ANTIBIO HI VISC N RADPQ NA [facility] [id number] Right 1 Implanted   TIB PRN NP STM 5 DEG SZ GR -- PERSONA - [id number]  TIB PRN NP STM 5 DEG SZ GR -- PERSONA [id number] [facility] [id number] Right 1 Implanted   PAT PSN VE POLY 38MM -- PERSONA - SNA  PAT PSN VE POLY 38MM -- PERSONA NA [facility] [facility] [id number] Right 1 Implanted   IMPL PSN FEM CP CMT CCR STD SZ8R - SNA  IMPL PSN FEM CP CMT CCR STD SZ8R NA [employer] [id number] Right 1 Implanted   PSN MC VE ASF R 10MM 8-11 GH - SNA  PSN MC VE ASF R 10MM 8-11 GH NA [employer]_ [id number] Right 1 Implanted       Drains: * No LDAs found *    Findings: DJD with retained hardware    Electronically Signed by Ondina Bennett MD on 7/18/2022 at 9:21 AM

## 2022-07-18 NOTE — ANESTHESIA PROCEDURE NOTES
Peripheral Block    Start time: 7/18/2022 7:20 AM  End time: 7/18/2022 7:25 AM  Performed by: Loulou Gayle MD  Authorized by: Loulou Gayle MD       Pre-procedure:    Indications: at surgeon's request    Preanesthetic Checklist: patient identified, risks and benefits discussed, site marked, timeout performed, anesthesia consent given, patient being monitored and fire risk safety assessment completed and verbalized    Timeout Time: 07:20 EDT          Block Type:   Block Type:  Saphenous  Laterality:  Right  Monitoring:  Oxygen, responsive to questions, standard ASA monitoring, continuous pulse ox, frequent vital sign checks and heart rate  Injection Technique:  Single shot  Procedures: ultrasound guided    Patient Position: supine  Prep: chlorhexidine    Location:  Mid thigh  Needle Type:  Stimuplex  Needle Gauge:  20 G  Needle Localization:  Ultrasound guidance  Medication Injected:  Ropivacaine (PF) (NAROPIN)(0.5%) 5 mg/mL injection, 30 mL  Med Admin Time: 7/18/2022 7:25 AM    Assessment:  Number of attempts:  1  Injection Assessment:  No paresthesia, negative aspiration for CSF, incremental injection every 5 mL, no intravascular symptoms, negative aspiration for blood and local visualized surrounding nerve on ultrasound  Patient tolerance:  Patient tolerated the procedure well with no immediate complications

## 2022-07-19 VITALS
DIASTOLIC BLOOD PRESSURE: 71 MMHG | BODY MASS INDEX: 28.79 KG/M2 | HEIGHT: 68 IN | WEIGHT: 190 LBS | HEART RATE: 86 BPM | SYSTOLIC BLOOD PRESSURE: 110 MMHG | OXYGEN SATURATION: 94 % | TEMPERATURE: 98.8 F | RESPIRATION RATE: 16 BRPM

## 2022-07-19 LAB
ANION GAP SERPL CALC-SCNC: 7 MMOL/L (ref 5–15)
BUN SERPL-MCNC: 15 MG/DL (ref 6–20)
BUN/CREAT SERPL: 12 (ref 12–20)
CALCIUM SERPL-MCNC: 8.2 MG/DL (ref 8.5–10.1)
CHLORIDE SERPL-SCNC: 107 MMOL/L (ref 97–108)
CO2 SERPL-SCNC: 24 MMOL/L (ref 21–32)
CREAT SERPL-MCNC: 1.25 MG/DL (ref 0.7–1.3)
GLUCOSE SERPL-MCNC: 107 MG/DL (ref 65–100)
HGB BLD-MCNC: 13.2 G/DL (ref 12.1–17)
POTASSIUM SERPL-SCNC: 3.9 MMOL/L (ref 3.5–5.1)
SODIUM SERPL-SCNC: 138 MMOL/L (ref 136–145)

## 2022-07-19 PROCEDURE — 74011000250 HC RX REV CODE- 250: Performed by: PHYSICIAN ASSISTANT

## 2022-07-19 PROCEDURE — 74011250637 HC RX REV CODE- 250/637

## 2022-07-19 PROCEDURE — 36415 COLL VENOUS BLD VENIPUNCTURE: CPT

## 2022-07-19 PROCEDURE — 97116 GAIT TRAINING THERAPY: CPT

## 2022-07-19 PROCEDURE — 85018 HEMOGLOBIN: CPT

## 2022-07-19 PROCEDURE — 97535 SELF CARE MNGMENT TRAINING: CPT

## 2022-07-19 PROCEDURE — 97165 OT EVAL LOW COMPLEX 30 MIN: CPT

## 2022-07-19 PROCEDURE — 80048 BASIC METABOLIC PNL TOTAL CA: CPT

## 2022-07-19 PROCEDURE — 74011250637 HC RX REV CODE- 250/637: Performed by: PHYSICIAN ASSISTANT

## 2022-07-19 PROCEDURE — 74011250636 HC RX REV CODE- 250/636: Performed by: PHYSICIAN ASSISTANT

## 2022-07-19 RX ORDER — OXYCODONE HYDROCHLORIDE 5 MG/1
5 TABLET ORAL ONCE
Status: COMPLETED | OUTPATIENT
Start: 2022-07-19 | End: 2022-07-19

## 2022-07-19 RX ORDER — OXYCODONE HYDROCHLORIDE 5 MG/1
5-10 TABLET ORAL
Qty: 50 TABLET | Refills: 0 | Status: SHIPPED | OUTPATIENT
Start: 2022-07-19 | End: 2022-08-02

## 2022-07-19 RX ADMIN — ACETAMINOPHEN 650 MG: 325 TABLET ORAL at 00:27

## 2022-07-19 RX ADMIN — ACETAMINOPHEN 650 MG: 325 TABLET ORAL at 06:02

## 2022-07-19 RX ADMIN — OXYCODONE 5 MG: 5 TABLET ORAL at 11:21

## 2022-07-19 RX ADMIN — ACETAMINOPHEN 650 MG: 325 TABLET ORAL at 11:20

## 2022-07-19 RX ADMIN — DOCUSATE SODIUM 50MG AND SENNOSIDES 8.6MG 1 TABLET: 8.6; 5 TABLET, FILM COATED ORAL at 09:06

## 2022-07-19 RX ADMIN — OXYCODONE 10 MG: 5 TABLET ORAL at 09:06

## 2022-07-19 RX ADMIN — POLYETHYLENE GLYCOL 3350 17 G: 17 POWDER, FOR SOLUTION ORAL at 09:06

## 2022-07-19 RX ADMIN — OXYCODONE 10 MG: 5 TABLET ORAL at 06:02

## 2022-07-19 RX ADMIN — WATER 2 G: 1 INJECTION INTRAMUSCULAR; INTRAVENOUS; SUBCUTANEOUS at 00:27

## 2022-07-19 RX ADMIN — APIXABAN 2.5 MG: 2.5 TABLET, FILM COATED ORAL at 03:10

## 2022-07-19 RX ADMIN — OXYCODONE 10 MG: 5 TABLET ORAL at 00:27

## 2022-07-19 RX ADMIN — SODIUM CHLORIDE 125 ML/HR: 9 INJECTION, SOLUTION INTRAVENOUS at 00:43

## 2022-07-19 RX ADMIN — DULOXETINE HYDROCHLORIDE 30 MG: 30 CAPSULE, DELAYED RELEASE ORAL at 07:06

## 2022-07-19 RX ADMIN — SODIUM CHLORIDE, PRESERVATIVE FREE 10 ML: 5 INJECTION INTRAVENOUS at 06:00

## 2022-07-19 NOTE — PROGRESS NOTES
Problem: Knee Replacement: Day of Surgery/Unit  Goal: Activity/Safety  Outcome: Progressing Towards Goal  Note: Fall precautions, call bell within reach. Goal: Respiratory  Outcome: Progressing Towards Goal  Note: Tolerating incentive spirometry very well  Goal: Psychosocial  Outcome: Progressing Towards Goal  Goal: *Initiate mobility  Outcome: Progressing Towards Goal  Note: Mobilized with PT   Goal: *Optimal pain control at patient's stated goal  Outcome: Progressing Towards Goal  Goal: *Hemodynamically stable  Outcome: Progressing Towards Goal     Problem: Falls - Risk of  Goal: *Absence of Falls  Description: Document Jenise Fall Risk and appropriate interventions in the flowsheet.   Note: Fall Risk Interventions:  Mobility Interventions: Communicate number of staff needed for ambulation/transfer,OT consult for ADLs,Patient to call before getting OOB         Medication Interventions: Patient to call before getting OOB,Teach patient to arise slowly    Elimination Interventions: Call light in reach,Patient to call for help with toileting needs,Stay With Me (per policy)

## 2022-07-19 NOTE — PROGRESS NOTES
Orders received, chart reviewed and patient evaluated by occupational therapy. Pending progression with skilled acute occupational therapy, recommend:  Occupational therapy at least 2 days/week in the home      Recommend with nursing ADLs with supervision/setup, OOB to chair 3x/day and toileting via functional mobility to and from bathroom x1 assist and walker. Thank you for completing as able in order to maintain patient strength, endurance and independence. Full evaluation to follow.      Lynnette Umana MS, OTR/L

## 2022-07-19 NOTE — PROGRESS NOTES
Problem: Falls - Risk of  Goal: *Absence of Falls  Description: Document Parviz Russo Fall Risk and appropriate interventions in the flowsheet.   Outcome: Progressing Towards Goal  Note: Fall Risk Interventions:  Mobility Interventions: Communicate number of staff needed for ambulation/transfer,OT consult for ADLs,Patient to call before getting OOB         Medication Interventions: Teach patient to arise slowly,Patient to call before getting OOB    Elimination Interventions: Call light in reach,Patient to call for help with toileting needs,Stay With Me (per policy)              Problem: Knee Replacement: Post-Op Day 1  Goal: Activity/Safety  Outcome: Progressing Towards Goal  Note: Passed PT  Goal: Discharge Planning  Outcome: Progressing Towards Goal  Note: Dc today  Goal: *Demonstrates progressive activity  Outcome: Progressing Towards Goal  Goal: *Optimal pain control at patient's stated goal  Outcome: Progressing Towards Goal  Goal: *Hemodynamically stable  Outcome: Progressing Towards Goal  Goal: *Discharge plan identified  Outcome: Progressing Towards Goal  Note: Dc w/ hh

## 2022-07-19 NOTE — PROGRESS NOTES
Problem: Mobility Impaired (Adult and Pediatric)  Goal: *Acute Goals and Plan of Care (Insert Text)  Description: FUNCTIONAL STATUS PRIOR TO ADMISSION: Patient was independent and active without use of DME.    HOME SUPPORT PRIOR TO ADMISSION: The patient lived with wife but did not require assist.    Physical Therapy Goals  Initiated 7/18/2022    1. Patient will move from supine to sit and sit to supine , scoot up and down, and roll side to side in bed with modified independence within 4 days. 2. Patient will perform sit to stand with modified independence within 4 days. 3. Patient will ambulate with modified independence for 150 feet with the least restrictive device within 4 days. 4. Patient will ascend/descend 4 stairs with cane and one handrail(s) with modified independence within 4 days. 5. Patient will perform home exercise program per protocol with independence within 4 days. 6. Patient will demonstrate AROM 0-90 degrees in operative joint within 4 days. Outcome: Resolved/Met   PHYSICAL THERAPY TREATMENT/DISCHARGE  Patient: Sukhjinder Munson (25 y.o. male)  Date: 7/19/2022  Diagnosis: Primary osteoarthritis of right knee [M17.11]  Osteoarthritis of right knee [M17.11] Osteoarthritis of right knee  Procedure(s) (LRB):  RIGHT TOTAL KNEE REPLACEMENT (Right) 1 Day Post-Op  Precautions: Fall,WBAT  Chart, physical therapy assessment, plan of care and goals were reviewed. ASSESSMENT  Patient continues with skilled PT services and has met acute care PT goals. Patient is cleared for discharge from PT standpoint. Patient  is independent with post-op TKA exercise protocol and has same in written, illlustrated form. Educated patient on Discharge Instructions relating to PT program progression post-discharge. He demonstrated/verbalized understanding and watched Discharge Video.     Barthel Functional Score has improved to 90/100, indicating minimal impaired ability to care for basic self-needs/dependency on others. Other factors to consider for discharge: Motivated/A & O x 4/Supportive Family/Independent PLOF          PLAN :  Patient will be discharged from acute skilled physical therapy at this time. Rationale for discharge:  Goals achieved    Recommendation for discharge: (in order for the patient to meet his/her long term goals)  Physical therapy at least 2 days/week in the home     This discharge recommendation:  Has been made in collaboration with the attending provider and/or case management    IF patient discharges home will need the following DME: patient owns DME required for discharge       SUBJECTIVE:   Patient stated I am in a lot of pain .     OBJECTIVE DATA SUMMARY:   Critical Behavior:  Neurologic State: Alert  Orientation Level: Oriented X4  Cognition: Appropriate decision making,Appropriate safety awareness,Follows commands  Safety/Judgement: Awareness of environment,Insight into deficits  Functional Mobility Training:  Bed Mobility:     Supine to Sit: Stand-by assistance  Sit to Supine: Stand-by assistance  Scooting: Stand-by assistance        Transfers:  Sit to Stand: Stand-by assistance  Stand to Sit: Stand-by assistance        Bed to Chair: Stand-by assistance                    Balance:  Sitting: Intact  Standing: Intact; With support  Ambulation/Gait Training:  Distance (ft): 150 Feet (ft)  Assistive Device: Walker, rolling;Gait belt  Ambulation - Level of Assistance: Stand-by assistance        Gait Abnormalities: Antalgic  Right Side Weight Bearing: As tolerated     Base of Support: Shift to left  Stance: Right decreased  Speed/Jaclyn: Slow  Step Length: Left shortened  Swing Pattern: Right asymmetrical     Interventions: Safety awareness training;Verbal cues       Stairs:  Number of Stairs Trained: 4  Stairs - Level of Assistance: Stand-by assistance   Rail Use: Right  (right rail and cane)    Therapeutic Exercises:   Patient  is independent with post-op TKA exercise protocol and has same in written, illlustrated form. Barthel Index:    Bathin  Bladder: 10  Bowels: 10  Groomin  Dressin  Feeding: 10  Mobility: 15  Stairs: 10  Toilet Use: 10  Transfer (Bed to Chair and Back): 15  Total: 90/100       The Barthel ADL Index: Guidelines  1. The index should be used as a record of what a patient does, not as a record of what a patient could do. 2. The main aim is to establish degree of independence from any help, physical or verbal, however minor and for whatever reason. 3. The need for supervision renders the patient not independent. 4. A patient's performance should be established using the best available evidence. Asking the patient, friends/relatives and nurses are the usual sources, but direct observation and common sense are also important. However direct testing is not needed. 5. Usually the patient's performance over the preceding 24-48 hours is important, but occasionally longer periods will be relevant. 6. Middle categories imply that the patient supplies over 50 per cent of the effort. 7. Use of aids to be independent is allowed. Score Interpretation (from [address] [address])    Independent   60-79 Minimally independent   40-59 Partially dependent   20-39 Very dependent   <20 Totally dependent     -Carter Sahu., Barthel, D.W. (1965). Functional evaluation: the Barthel Index. [address] [facility] ([address] (1997). The Barthel activities of daily living index: self-reporting versus actual performance in the old (> or = 75 years). Journal of [address] [place] [address] 45(7), [address] [facility], J.J.M.F, Erlinda Leal., Dipti Mendez. (). Measuring the change in disability after inpatient rehabilitation; comparison of the responsiveness of the Barthel Index and Functional [place] Measure. Journal of Neurology, Neurosurgery, and Psychiatry, 66(4), 300-687.   -CHAIM Calixto, ANNE Olivares, Jaamr Ramos M.A. (2004) Assessment of post-stroke quality of life in cost-effectiveness studies: The usefulness of the Barthel Index and the EuroQoL-5D. Quality of Life Research, 13, 148-94        Pain Ratin/10 (asked nurse if he can have any other pain medication, had Oxycodone at 9:15). Activity Tolerance:   Good    After treatment patient left in no apparent distress:   Supine in bed, Call bell within reach, Side rails x 3, and nurse notified. COMMUNICATION/COLLABORATION:   The patients plan of care was discussed with: Occupational therapist, Registered nurse, Physician, and Case management.      Janett Greenwood   Time Calculation: 30 mins

## 2022-07-19 NOTE — PROGRESS NOTES
Bedside shift change report given to Pancho Otoole RN (oncoming nurse) by [facility], RN (offgoing nurse). Report included the following information SBAR, Kardex, ED Summary, OR Summary, Procedure Summary, Intake/Output, MAR, Recent Results and Med Rec Status.

## 2022-07-19 NOTE — NURSE NAVIGATOR
IV removed. Discharge video viewed and discharge instructions reviewed; all questions addressed. Provided dressing change supplies and gel packs for use at home. Assisted into car for transport home with wife.

## 2022-07-19 NOTE — PROGRESS NOTES
Ortho Daily Progress Note      Patient: Kyler Acevedo                   MRN: [id number]  Sex: male  YOB: 1965           Age: 62 y.o.     1 Day Post-Op     Procedure(s):  RIGHT TOTAL KNEE REPLACEMENT    Subjective:    -Pain:  Severe pain this morning, but \"better now\". Was on chronic oxycodone until April for his rheumatoid arthritis; he is unsure if he still has tolerance to the opioids.  -Ambulating with PT  -Tolerating PO diet, no nausea. Pain meds tolerated. -Voiding appropriately  -Vitals stable    Visit Vitals  /79 (BP 1 Location: Left upper arm, BP Patient Position: At rest;Lying)   Pulse 72   Temp 98.5 °F (36.9 °C)   Resp 16   Ht 5' 8\" (1.727 m)   Wt 86.2 kg (190 lb)   SpO2 98%   BMI 28.89 kg/m²        Recent Labs     07/19/22  0039 06/06/22  0846 06/06/22  0846   HGB 13.2   < > 15.7   CREA 1.25   < > 1.12   BUN 15  --  11    < > = values in this interval not displayed. Physical Exam:    GENERAL: alert, no acute distress  NEURO:  Sensation grossly diminished over the lateral knee. VASCULAR: DP/TP pulses 2+. Extremity warm. mild edema of RLE  DRESSING:  Clean, dry, and intact and Dry dressing in place  MSK:  Moving lower extremities well  DVT EXAM: No posterior calf tenderness, tightness, erythema, palpable cords, or pain upon active dorsi/plantar flexion of the foot. Plan:    DVT ppx: Apixaban    Activity: WBAT with PT-mobilization    Pain Control: stable, mild-to-moderate joint symptoms intermittently, reasonably well controlled by current meds. Oxy 5-10mg Q3 prn for pain    Dressing: Keep dry dressing on incision. Patient elects to change it at home in 1-2 days    Hgb:  Stable, repeat in AM if still here    Hypocalcemia: Likely secondary to hemodilution and albumin losses intraop.   Repeat BMP if still here tomorrow     Discharge: Home today if cleared by Luis Gong PA-C  7/19/2022   8:17 AM

## 2022-07-19 NOTE — DISCHARGE SUMMARY
Ortho Discharge Summary    Patient ID:  Anel Sharp  [id number]  male  62 y.o.  1965    Admit date: 7/18/2022    Discharge date: 7/19/2022    Admitting Physician: Doc Patten MD     Consulting Physician(s):   Treatment Team: Attending Provider: Nubia Keller MD; Primary Nurse: Katarzyna Maldonado, VISHNU; Occupational Therapist: Angela Barboza OT; Physical Therapist: Ken Jane; Care Manager: Luz Maria Aaron    Date of Surgery:   7/18/2022     Preoperative Diagnosis:  Primary osteoarthritis of right knee [M17.11]    Postoperative Diagnosis:   Primary osteoarthritis of right knee [M17.11]    Procedure(s):   RIGHT TOTAL KNEE REPLACEMENT     Anesthesia Type:   General     Surgeon: Nubia Keller MD                            HPI:  Pt is a 62 y.o. male who has a history of Primary osteoarthritis of right knee [M17.11]  with pain and limitations of activities of daily living who presents at this time for a right RIGHT TOTAL KNEE REPLACEMENT following the failure of conservative management. PMH:   Past Medical History:   Diagnosis Date    Chronic pain     Depression     GERD (gastroesophageal reflux disease)     Hypertension     Rheumatoid arthritis ([address]        Body mass index is 28.89 kg/m². : A BMI > 30 is classified as obesity and > 40 is classified as morbid obesity. Medications upon admission :   Prior to Admission Medications   Prescriptions Last Dose Informant Patient Reported? Taking? DULoxetine (CYMBALTA) 60 mg capsule 7/17/2022 at Unknown time  Yes Yes   Sig: Take 60 mg by mouth every evening. DULoxetine (Cymbalta) 30 mg capsule 7/17/2022 at Unknown time  Yes Yes   Sig: Take 30 mg by mouth Every morning. albuterol (PROVENTIL HFA, VENTOLIN HFA, PROAIR HFA) 90 mcg/actuation inhaler 7/18/2022 at 0530  Yes Yes   Sig: Take  by inhalation as needed for Wheezing.    famotidine (PEPCID) 20 mg tablet 7/17/2022 at Unknown time  Yes Yes   Sig: Take 20 mg by mouth as needed for Heartburn. ibuprofen (MOTRIN) 200 mg tablet 2022 at Unknown time  Yes Yes   Sig: Take  by mouth as needed for Pain.   leflunomide (Arava) 10 mg tablet 2022 at Unknown time  Yes Yes   Sig: Take 10 mg by mouth every evening. lisinopriL (PRINIVIL, ZESTRIL) 10 mg tablet 2022 at Unknown time  Yes Yes   Sig: Take 10 mg by mouth every evening. loperamide (IMMODIUM) 2 mg tablet 7/15/2022 at Unknown time  Yes Yes   Sig: Take 2 mg by mouth as needed for Diarrhea.   medical marijuana 2022 at Unknown time  Yes Yes   Sig: Take 1 Each by mouth daily as needed for Pain.   pantoprazole (PROTONIX) 40 mg tablet 2022 at Unknown time  Yes Yes   Sig: Take 40 mg by mouth every evening. tocilizumab (Actemra) 80 mg/4 mL (20 mg/mL) soln injection 2022  Yes No   Si mg/kg by IntraVENous route every month. traZODone (DESYREL) 150 mg tablet 2022 at Unknown time  Yes Yes   Sig: Take 150 mg by mouth nightly. Facility-Administered Medications: None        Allergies: Allergies   Allergen Reactions    Penicillins Rash     NO HOSPITALIZATION NEEDED        Hospital Course: The patient underwent surgery. Complications:  None; patient tolerated the procedure well. Was taken to the PACU in stable condition and then transferred to the ortho floor. Patient mobilized on day of surgery. Overnight pain control achieved using primarily scheduled tylenol and as needed oxycodone 5-10 mg. Patient reevaluated on POD 1 by therapy with clearance for discharge home with home health and wife's support.        Perioperative Antibiotics:  Ancef     Postoperative Pain Management:  Oxycodone & Tylenol, Mobic    DVT Prophylaxis: Eliquis  2.5  mg PO BID    Postoperative transfusions:    Number of units banked PRBCs =   none     Post Op complications: none    Hemoglobin at discharge:    Lab Results   Component Value Date/Time    HGB 13.2 2022 12:39 AM    INR 1.0 2022 08:46 AM       Dressing was changed on POD # 1. Incision - clean, dry and intact. No significant erythema or swelling. Wound appears to be healing without any evidence of infection. Neurovascular exam found to be within normal limits. Physical Therapy started following surgery and participated in bed mobility, transfers and ambulation. Gait:  Gait  Base of Support: Shift to left  Speed/Jaclyn: Slow  Step Length: Left shortened  Swing Pattern: Right asymmetrical  Stance: Right decreased  Gait Abnormalities: Antalgic  Ambulation - Level of Assistance: Stand-by assistance  Distance (ft): 150 Feet (ft)  Assistive Device: Walker, rolling,Gait belt  Rail Use: Right  (right rail and cane)  Stairs - Level of Assistance: Stand-by assistance  Number of Stairs Trained: 4  Interventions: Safety awareness training,Verbal cues            Interventions: Safety awareness training,Verbal cues      Discharged to: Home. Condition on Discharge:   stable    Discharge instructions:  - Anticoagulate with Eliquis  2.5  mg PO BID  - Take pain medications as prescribed  - Resume pre hospital diet      - Discharge activity: activity as tolerated  - Ambulate with assistive device as needed. - Weight bearing status as tolerated  - Wound Care Keep wound clean and dry. See discharge instruction sheet. -DISCHARGE MEDICATION LIST     Current Discharge Medication List      START taking these medications    Details   oxyCODONE IR (ROXICODONE) 5 mg immediate release tablet Take 1-2 Tablets by mouth every four (4) hours as needed for Pain for up to 14 days. Max Daily Amount: 60 mg.  Qty: 50 Tablet, Refills: 0  Start date: 7/19/2022, End date: 8/2/2022    Associated Diagnoses: Aftercare following right knee joint replacement surgery      apixaban (Eliquis) 2.5 mg tablet Take 1 Tablet by mouth two (2) times a day. Qty: 28 Tablet, Refills: 0  Start date: 7/18/2022      meloxicam (MOBIC) 7.5 mg tablet Take 1 Tablet by mouth daily.   Qty: 30 Tablet, Refills: 0  Start date: 7/18/2022         CONTINUE these medications which have CHANGED    Details   tocilizumab (Actemra) 80 mg/4 mL (20 mg/mL) soln injection 17.24 mL by IntraVENous route every month. Do not take until incision is healed externally  Qty: 1 Each, Refills: 0  Start date: 7/18/2022         CONTINUE these medications which have NOT CHANGED    Details   leflunomide (Arava) 10 mg tablet Take 10 mg by mouth every evening. !! DULoxetine (CYMBALTA) 60 mg capsule Take 60 mg by mouth every evening. lisinopriL (PRINIVIL, ZESTRIL) 10 mg tablet Take 10 mg by mouth every evening. !! DULoxetine (Cymbalta) 30 mg capsule Take 30 mg by mouth Every morning. traZODone (DESYREL) 150 mg tablet Take 150 mg by mouth nightly. albuterol (PROVENTIL HFA, VENTOLIN HFA, PROAIR HFA) 90 mcg/actuation inhaler Take  by inhalation as needed for Wheezing.      medical marijuana Take 1 Each by mouth daily as needed for Pain.      pantoprazole (PROTONIX) 40 mg tablet Take 40 mg by mouth every evening. loperamide (IMMODIUM) 2 mg tablet Take 2 mg by mouth as needed for Diarrhea. famotidine (PEPCID) 20 mg tablet Take 20 mg by mouth as needed for Heartburn. !! - Potential duplicate medications found. Please discuss with provider.       STOP taking these medications       ibuprofen (MOTRIN) 200 mg tablet Comments:   Reason for Stopping:            per medical continuation form      -Follow up in office in 2 weeks with Dr. Leretha Angelucci      Signed:  Mary Higgins NP  Orthopaedic Nurse Practitioner    7/19/2022  11:22 AM

## 2022-07-19 NOTE — PROGRESS NOTES
OCCUPATIONAL THERAPY EVALUATION/DISCHARGE  Patient: Rickey Kearns (53 y.o. male)  Date: 7/19/2022  Primary Diagnosis: Primary osteoarthritis of right knee [M17.11]  Osteoarthritis of right knee [M17.11]  Procedure(s) (LRB):  RIGHT TOTAL KNEE REPLACEMENT (Right) 1 Day Post-Op   Precautions:  Fall,WBAT    ASSESSMENT  Based on the objective data described below, the patient presents with impaired strength/ROM impacting ability to complete ADL/IADL at baseline. Patient received reclined in bed, amenable to session. Discussed ADL/environmental modifications to maximize ADL independence/safety at home, patient verbalized understanding and demo'd good carryover. Patient able to complete LE dressing and standing with overall CGA. At this time, patient cleared for discharge from OT standpoint. Patient left reclined in bed, all needs in reach, NAD. Current Level of Function (ADLs/self-care): up to CGA ADL/functional mobility    Functional Outcome Measure: The patient scored 90/100 on the Barthel Index outcome measure. Other factors to consider for discharge: below baseline, TKA     PLAN :  Recommendation for discharge: (in order for the patient to meet his/her long term goals)  Occupational therapy at least 2 days/week in the home     This discharge recommendation:  Has been made in collaboration with the attending provider and/or case management    IF patient discharges home will need the following DME: none       SUBJECTIVE:   Patient stated I have no problem with that.  re: LE dressing    OBJECTIVE DATA SUMMARY:   HISTORY:   Past Medical History:   Diagnosis Date    Chronic pain     Depression     GERD (gastroesophageal reflux disease)     Hypertension     Rheumatoid arthritis ([facility] [address]      Past Surgical History:   Procedure Laterality Date    HX GI      COLONOSCOPY    HX KNEE ARTHROSCOPY Left 04/2022    HX ORTHOPAEDIC Right 1996    KNEE RECONSTRUCTION    HX ORTHOPAEDIC Right 2000     KNEE REVISION Prior Level of Function/Environment/Context:   Expanded or extensive additional review of patient history:     Home Situation  Home Environment: Private residence  # Steps to Enter: 3  Rails to Enter: Yes  Hand Rails : Right  Wheelchair Ramp: No  One/Two Story Residence: One story  Living Alone: No  Support Systems: Spouse/Significant Other  Patient Expects to be Discharged to[de-identified] Home with home health  Current DME Used/Available at Home: Cane, straight,Crutches,Walker, rolling  Tub or Shower Type: Shower      EXAMINATION OF PERFORMANCE DEFICITS:  Cognitive/Behavioral Status:  Neurologic State: Alert  Orientation Level: Oriented X4  Cognition: Appropriate decision making; Appropriate safety awareness; Follows commands  Perception: Appears intact  Perseveration: No perseveration noted  Safety/Judgement: Awareness of environment; Insight into deficits    Skin: intact minus staples. Ace wrap removed and RN made aware of need for bandage    Edema: RLE around incision    Hearing: Auditory  Auditory Impairment: None    Vision/Perceptual:    Tracking: Able to track stimulus in all quadrants w/o difficulty                      Acuity: Within Defined Limits         Range of Motion:  BUE  AROM: Generally decreased, functional                         Strength:  BUE  Strength: Generally decreased, functional                Coordination:  Coordination: Within functional limits  Fine Motor Skills-Upper: Left Intact; Right Intact    Gross Motor Skills-Upper: Left Intact; Right Intact    Tone & Sensation:  BUE  Tone: Normal  Sensation: Intact                      Balance:  Sitting: Intact  Standing: Intact; With support    Functional Mobility and Transfers for ADLs:  Bed Mobility:  Supine to Sit: Stand-by assistance  Sit to Supine: Stand-by assistance  Scooting: Stand-by assistance    Transfers:  Sit to Stand: Stand-by assistance  Stand to Sit: Stand-by assistance  Bed to Chair: Stand-by assistance    ADL Assessment:  Feeding: Independent    Oral Facial Hygiene/Grooming: Stand-by assistance    Bathing: Stand-by assistance    Upper Body Dressing: Stand-by assistance    Lower Body Dressing: Supervision    Toileting: Stand by assistance                ADL Intervention and task modifications:       Grooming  Grooming Assistance: Set-up  Position Performed: Standing                   Lower Body Dressing Assistance  Pants With Button/Zipper: Contact guard assistance  Socks: Contact guard assistance  Leg Crossed Method Used: No  Position Performed: Bending forward method;Seated edge of bed         Cognitive Retraining  Safety/Judgement: Awareness of environment; Insight into deficits    Bathing: Patient instructed and indicated understanding when bathing to not submerge wound in water, stand to shower or sponge bathe, cover wound with plastic and tape to ensure no water reaches bandage/wound without cues. Dressing joint: Patient instructed and demonstrated understanding to don/doff Right LE first/last with verbal cues. Patient instructed and demonstrated to don all clothing while sitting prior to standing, doff all clothing to knees while standing, then sit to doff clothing off from knees to feet in order to facilitate fall prevention, pain management, and energy conservation with Stand-by assistance. Dressing joint reach exercise: To increase independence with lower body dressing, patient instructed and demonstrated to reach down Right LE in a seated position slowly to prevent tearing/shearing until slight pull is felt, hold at end range for 10 seconds, then return to starting upright position with Stand-by assistance. Patient instructed to complete three sets of three repetitions each daily.    Home safety: Patient instructed and indicated understanding on home modifications and safety (raise height of ADL objects, appropriate height of chair surfaces, recliner safety, change of floor surfaces, clear pathways) to increase independence and fall prevention. Standing: Patient instructed and demonstrated during ADLs to walk up to sink/counter top/surfaces, step into walker to increase safety of joint and fall prevention with Stand-by assistance. Patient educated about knee anatomy and educated to avoid rotation of Right LE. Instructed to apply concept to ADLs within the home (no twisting of knee during reaching across body, square off while using objects, slide objects along surfaces). Patient instructed and indicated understanding to increase amount of time standing, observe standing position during ADLs in order to increase even weight bearing through bilateral LEs in order to increase independence with ADLs. Goal to be reached 30 days post - op, per orthopedic surgeon or per PT. Tub/shower transfer: Patient instructed and indicated understanding regarding when it is safe to begin transfer into tub/shower (complete stairs with PT, advance exercises with PT high enough to clear tub/shower height). Patient instructed to use the same technique as used with stairs when entering and exiting tub/shower (\"up with the non-surgical, down with the surgical leg\"). Functional Measure:    Barthel Index:  Bathin  Bladder: 10  Bowels: 10  Groomin  Dressin  Feeding: 10  Mobility: 15  Stairs: 10  Toilet Use: 10  Transfer (Bed to Chair and Back): 15  Total: 90/100      The Barthel ADL Index: Guidelines  1. The index should be used as a record of what a patient does, not as a record of what a patient could do. 2. The main aim is to establish degree of independence from any help, physical or verbal, however minor and for whatever reason. 3. The need for supervision renders the patient not independent. 4. A patient's performance should be established using the best available evidence. Asking the patient, friends/relatives and nurses are the usual sources, but direct observation and common sense are also important.  However direct testing is not needed. 5. Usually the patient's performance over the preceding 24-48 hours is important, but occasionally longer periods will be relevant. 6. Middle categories imply that the patient supplies over 50 per cent of the effort. 7. Use of aids to be independent is allowed. Score Interpretation (from [address])    Independent   60-79 Minimally independent   40-59 Partially dependent   20-39 Very dependent   <20 Totally dependent     -Carter Sahu., Barthel, DSamW. (1965). Functional evaluation: the Barthel Index. [address] ([address] (1997). The Barthel activities of daily living index: self-reporting versus actual performance in the old (> or = 75 years). Journal of [address] [place] [address] 45(7), [address] [facility], MEAGANF, Eder Delgado., Mabel Felty. (1999). Measuring the change in disability after inpatient rehabilitation; comparison of the responsiveness of the Barthel Index and Functional Motley Measure. Journal of Neurology, Neurosurgery, and Psychiatry, 66(4), 755-055. Angie Sanchez, N.J.A, ANNE Olivares, & Elie Jones M.A. (2004) Assessment of post-stroke quality of life in cost-effectiveness studies: The usefulness of the Barthel Index and the EuroQoL-5D.  Quality of Life Research, 15, 323-87         Occupational Therapy Evaluation Charge Determination   History Examination Decision-Making   LOW Complexity : Brief history review  LOW Complexity : 1-3 performance deficits relating to physical, cognitive , or psychosocial skils that result in activity limitations and / or participation restrictions  LOW Complexity : No comorbidities that affect functional and no verbal or physical assistance needed to complete eval tasks       Based on the above components, the patient evaluation is determined to be of the following complexity level: LOW   Pain Ratin-8/10, RN aware    Activity Tolerance:   Good and requires rest breaks    After treatment patient left in no apparent distress:    Supine in bed, Call bell within reach and Side rails x 3    COMMUNICATION/EDUCATION:   The patients plan of care was discussed with: Physical therapist and Registered nurse.      Thank you for this referral.  Fior Chambers OT  Time Calculation: 30 mins

## 2022-07-19 NOTE — PROGRESS NOTES
Ortho NP Note    POD# 1  s/p RIGHT TOTAL KNEE REPLACEMENT   Pt seen in room    Pt resting in bed. Reports pain is currently 7/10 after completing AM therapy. Prior to therapy felt pain was overall well controlled but exacerbated after extensive walking and completing stair training. Has used oxycodone in the past and overall with good pain relief. Ate breakfast, no N/V.  + Flatus. No CP, no SOB. VSS Afebrile. Visit Vitals  /71 (BP 1 Location: Left upper arm, BP Patient Position: At rest)   Pulse 86   Temp 98.8 °F (37.1 °C)   Resp 16   Ht 5' 8\" (1.727 m)   Wt 86.2 kg (190 lb)   SpO2 94%   BMI 28.89 kg/m²       Voiding status: spontaneous void   Output (mL)  Urine Voided: 150 ml (07/18/22 2230)  Last Bowel Movement Date: 07/17/22 (07/18/22 0649)      Labs    Lab Results   Component Value Date/Time    HGB 13.2 07/19/2022 12:39 AM      Lab Results   Component Value Date/Time    INR 1.0 06/06/2022 08:46 AM      Lab Results   Component Value Date/Time    Sodium 138 07/19/2022 12:39 AM    Potassium 3.9 07/19/2022 12:39 AM    Chloride 107 07/19/2022 12:39 AM    CO2 24 07/19/2022 12:39 AM    Glucose 107 (H) 07/19/2022 12:39 AM    BUN 15 07/19/2022 12:39 AM    Creatinine 1.25 07/19/2022 12:39 AM    Calcium 8.2 (L) 07/19/2022 12:39 AM     Recent Glucose Results:   Lab Results   Component Value Date/Time     (H) 07/19/2022 12:39 AM         ASSESSMENT/PLAN: Patient seen following therapy clearance. I have reviewed progress note and am in agreement with assessment and plan as documented by Scott MANRIQUE. In preparation for discharged, reviewed the following in room with patient. 1) PT: BID WBAT in hospital.  Therapy to be continued at home with HH--CM involved to arrange  2) Anticoagulation: Eliquis 2.5 mg PO BID for DVT Prophylaxis. Encouraged ongoing mobilization, bed exercises.    3) Pain - Multimodal approach including cryotherapy, scheduled tylenol with PRN oxycodone while in hospital.  Added once 5 mg oxycodone dose to be given now/prior to D/C. To be discharged with oxycodone, Mobic rx--all medications at [address] per patient request.  Discussed precautions for narcotic use: avoid driving, ETOH, other sedating medications. 4) Wound care: Wound care instructions/supplies provided to patient. 5) Post operative care: OBR: reports having at home. Encouraged IS. Follow up in 2 wks with Dr. Caitlin Torres   6) Readniess for discharge:      [x] Vital Signs stable    [x] Hgb stable : 13.2   [x] + Voiding    [x] Wound intact, drainage minimal    [x] Tolerating PO intake     [x] Cleared by PT (OT if applicable)     [] Stair training completed (if applicable)    [] Independent / Contact Guard Assist (household distance)     [] Bed mobility     [] Car transfers     [] ADLs    [x] Adequate pain control on oral medication alone     Discharge to home with home health today [facility] Health system still pending).     Imani Kaplan NP  Available via [employer]

## 2022-07-19 NOTE — PROGRESS NOTES
Transition of Care: Plan for discharge home with wife and HH. [facility] has accepted patient. Patient owns rolling walker. Family will transport patient home    CM met with patient at bedside. Patient asked that CM call his wife to obtain [facility] choice. CM called the wife, Saroj Jimenez #[phone], they prefer [facility]. CM sent referral.    The Plan for Transition of Care is related to the following treatment goals: home health    The Patient and/or patient representative  was provided with a choice of provider and agrees   with the discharge plan. [x] Yes [] No    Freedom of choice list was provided with basic dialogue that supports the patient's individualized plan of care/goals, treatment preferences and shares the quality data associated with the providers.  [x] Yes [] No      GIULIANA Zhang/SYDNIE

## 2022-10-07 ENCOUNTER — TRANSCRIBE ORDER (OUTPATIENT)
Dept: SCHEDULING | Age: 57
End: 2022-10-07

## 2022-10-07 DIAGNOSIS — M17.12 ARTHRITIS OF LEFT KNEE: Primary | ICD-10-CM

## 2022-11-07 ENCOUNTER — TRANSCRIBE ORDER (OUTPATIENT)
Dept: SCHEDULING | Age: 57
End: 2022-11-07

## 2022-11-07 DIAGNOSIS — M17.12 PRIMARY OSTEOARTHRITIS OF LEFT KNEE: Primary | ICD-10-CM

## 2023-02-24 ENCOUNTER — OFFICE VISIT (OUTPATIENT)
Dept: FAMILY MEDICINE CLINIC | Age: 58
End: 2023-02-24
Payer: COMMERCIAL

## 2023-02-24 VITALS
WEIGHT: 212 LBS | DIASTOLIC BLOOD PRESSURE: 84 MMHG | HEART RATE: 91 BPM | BODY MASS INDEX: 32.13 KG/M2 | SYSTOLIC BLOOD PRESSURE: 139 MMHG | OXYGEN SATURATION: 97 % | HEIGHT: 68 IN | RESPIRATION RATE: 16 BRPM | TEMPERATURE: 96.9 F

## 2023-02-24 DIAGNOSIS — I10 ESSENTIAL HYPERTENSION: Primary | ICD-10-CM

## 2023-02-24 DIAGNOSIS — F32.4 MAJOR DEPRESSIVE DISORDER WITH SINGLE EPISODE, IN PARTIAL REMISSION (HCC): ICD-10-CM

## 2023-02-24 DIAGNOSIS — H61.23 BILATERAL IMPACTED CERUMEN: ICD-10-CM

## 2023-02-24 RX ORDER — TRAZODONE HYDROCHLORIDE 150 MG/1
150 TABLET ORAL
Qty: 30 TABLET | Refills: 0 | Status: SHIPPED | OUTPATIENT
Start: 2023-02-24

## 2023-02-24 RX ORDER — OMEPRAZOLE 20 MG/1
CAPSULE, DELAYED RELEASE ORAL
COMMUNITY
Start: 2023-02-14

## 2023-02-24 RX ORDER — TRAZODONE HYDROCHLORIDE 150 MG/1
150 TABLET ORAL
Qty: 90 TABLET | Refills: 3 | Status: SHIPPED | OUTPATIENT
Start: 2023-02-24 | End: 2023-02-24 | Stop reason: SDUPTHER

## 2023-02-24 RX ORDER — TOCILIZUMAB 180 MG/ML
INJECTION, SOLUTION SUBCUTANEOUS
COMMUNITY
Start: 2023-02-14

## 2023-02-24 RX ORDER — LISINOPRIL 10 MG/1
10 TABLET ORAL EVERY EVENING
Qty: 90 TABLET | Refills: 1 | Status: SHIPPED | OUTPATIENT
Start: 2023-02-24

## 2023-02-24 NOTE — PROGRESS NOTES
1. \"Have you been to the ER, urgent care clinic since your last visit? Hospitalized since your last visit? \"  No    2. \"Have you seen or consulted any other health care providers outside of the [address] [place] [address] since your last visit? \"  Yes - 2/20/23 - [facility]      3. For patients aged 39-70: Has the patient had a colonoscopy / FIT/ Cologuard? Yes - no Care Gap present      If the patient is female:    4. For patients aged 41-77: Has the patient had a mammogram within the past 2 years? NA - based on age or sex      11. For patients aged 21-65: Has the patient had a pap smear?  NA - based on age or sex

## 2023-02-24 NOTE — PROGRESS NOTES
2/24/2023      Chief Complaint   Patient presents with    [facility]         History of Present Illness:        Juan Dobbins is a 62 y.o. male to establish care. Formerly followed by Dr. Rhonda Arango. Problems include RA with associated ortho issues, depression, and HTN. Feels pretty well, had R TKA last summer and will get left this summer. Allergies   Allergen Reactions    Penicillins Rash and Hives     NO HOSPITALIZATION NEEDED  Other reaction(s): hives  Other reaction(s): Hives, redness  Other reaction(s): Hives, redness      Abatacept Other (comments)     Flu like symptoms  Flu like symptoms      Doxycycline Nausea and Vomiting and Other (comments)     Other reaction(s): Unknown  Other reaction(s): Unknown         Current Outpatient Medications   Medication Sig    Actemra ACTPen 162 mg/0.9 mL injection     omeprazole (PRILOSEC) 20 mg capsule     traZODone (DESYREL) 150 mg tablet Take 1 Tablet by mouth nightly. lisinopriL (PRINIVIL, ZESTRIL) 10 mg tablet Take 1 Tablet by mouth every evening. leflunomide (ARAVA) 10 mg tablet Take 10 mg by mouth every evening. DULoxetine (CYMBALTA) 60 mg capsule Take 60 mg by mouth every evening. albuterol (PROVENTIL HFA, VENTOLIN HFA, PROAIR HFA) 90 mcg/actuation inhaler Take  by inhalation as needed for Wheezing.    medical marijuana Take 1 Each by mouth daily as needed for Pain.    famotidine (PEPCID) 20 mg tablet Take 20 mg by mouth as needed for Heartburn. tocilizumab (Actemra) 80 mg/4 mL (20 mg/mL) soln injection 17.24 mL by IntraVENous route every month. Do not take until incision is healed externally (Patient not taking: Reported on 2/24/2023)    apixaban (Eliquis) 2.5 mg tablet Take 1 Tablet by mouth two (2) times a day. (Patient not taking: Reported on 2/24/2023)    meloxicam (MOBIC) 7.5 mg tablet Take 1 Tablet by mouth daily. (Patient not taking: Reported on 2/24/2023)    DULoxetine (CYMBALTA) 30 mg capsule Take 30 mg by mouth Every morning.  (Patient not taking: Reported on 2/24/2023)    pantoprazole (PROTONIX) 40 mg tablet Take 40 mg by mouth every evening. (Patient not taking: Reported on 2/24/2023)    loperamide (IMMODIUM) 2 mg tablet Take 2 mg by mouth as needed for Diarrhea. (Patient not taking: Reported on 2/24/2023)     No current facility-administered medications for this visit. Physical Examination:    Visit Vitals  /84 (BP 1 Location: Left upper arm, BP Patient Position: Sitting, BP Cuff Size: Adult)   Pulse 91   Temp 96.9 °F (36.1 °C) (Oral)   Resp 16   Ht 5' 8\" (1.727 m)   Wt 212 lb (96.2 kg)   SpO2 97%   BMI 32.23 kg/m²      General:  Alert, cooperative, no distress. HEENT:  Normocephalic, without obvious abnormality, atraumatic. Conjunctivae/corneas clear. Pupils equal, round, reactive to light. Extraocular movements intact. TMs and external canals normal bilaterally when dense cerumen impactions cleared. Nasal mucosa and oropharynx clear. Lungs: Clear to auscultation bilaterally. Chest wall:  No tenderness or deformity. Heart:  Regular rate and rhythm, S1, S2 normal, no murmur, click, rub, or gallop. Abdomen:   Soft, non-tender. Bowel sounds normal. No masses. No organomegaly. Extremities: Extremities normal, atraumatic, no cyanosis or edema. Pulses: 2+ and symmetric all extremities. Skin: Skin color, texture, turgor normal. No rashes or lesions. Lymph nodes: Cervical, supraclavicular, and axillary nodes normal.   Neurologic: CNII-XII intact. Normal strength, sensation, and reflexes throughout. ASSESSMENT AND PLAN    1. Essential hypertension  Good control. Recheck in six months  - lisinopriL (PRINIVIL, ZESTRIL) 10 mg tablet; Take 1 Tablet by mouth every evening. Dispense: 90 Tablet; Refill: 1    2. Major depressive disorder with single episode, in partial remission (HCC)  stable  - traZODone (DESYREL) 150 mg tablet; Take 1 Tablet by mouth nightly. Dispense: 30 Tablet; Refill: 0    3.  Bilateral impacted cerumen  Ears cleared with combination of lavage and curretage.  - ME REMOVAL IMPACTED CERUMEN INSTRUMENTATION UNILAT              Orders Placed This Encounter    REMOVE IMPACTED EAR WAX    Actemra ACTPen 162 mg/0.9 mL injection    omeprazole (PRILOSEC) 20 mg capsule    DISCONTD: traZODone (DESYREL) 150 mg tablet     Sig: Take 1 Tablet by mouth nightly. Dispense:  90 Tablet     Refill:  3    traZODone (DESYREL) 150 mg tablet     Sig: Take 1 Tablet by mouth nightly. Dispense:  30 Tablet     Refill:  0    lisinopriL (PRINIVIL, ZESTRIL) 10 mg tablet     Sig: Take 1 Tablet by mouth every evening.      Dispense:  90 Tablet     Refill:  1       RTC 6 months    Tyra Salgado MD

## 2023-04-21 DIAGNOSIS — M17.12 ARTHRITIS OF LEFT KNEE: Primary | ICD-10-CM

## 2023-06-26 ENCOUNTER — OFFICE VISIT (OUTPATIENT)
Dept: FAMILY MEDICINE CLINIC | Age: 58
End: 2023-06-26
Payer: COMMERCIAL

## 2023-06-26 VITALS
RESPIRATION RATE: 16 BRPM | SYSTOLIC BLOOD PRESSURE: 138 MMHG | BODY MASS INDEX: 31.37 KG/M2 | DIASTOLIC BLOOD PRESSURE: 89 MMHG | WEIGHT: 207 LBS | TEMPERATURE: 96.9 F | HEART RATE: 74 BPM | OXYGEN SATURATION: 95 % | HEIGHT: 68 IN

## 2023-06-26 DIAGNOSIS — M05.79 RHEUMATOID ARTHRITIS INVOLVING MULTIPLE SITES WITH POSITIVE RHEUMATOID FACTOR (HCC): Primary | ICD-10-CM

## 2023-06-26 DIAGNOSIS — Z01.810 PREOPERATIVE CARDIOVASCULAR EXAMINATION: ICD-10-CM

## 2023-06-26 DIAGNOSIS — I10 ESSENTIAL (PRIMARY) HYPERTENSION: ICD-10-CM

## 2023-06-26 DIAGNOSIS — F32.4 MAJOR DEPRESSIVE DISORDER IN PARTIAL REMISSION, UNSPECIFIED WHETHER RECURRENT (HCC): ICD-10-CM

## 2023-06-26 PROCEDURE — 3075F SYST BP GE 130 - 139MM HG: CPT | Performed by: FAMILY MEDICINE

## 2023-06-26 PROCEDURE — 3079F DIAST BP 80-89 MM HG: CPT | Performed by: FAMILY MEDICINE

## 2023-06-26 PROCEDURE — 99213 OFFICE O/P EST LOW 20 MIN: CPT | Performed by: FAMILY MEDICINE

## 2023-06-26 RX ORDER — LEFLUNOMIDE 10 MG/1
10 TABLET ORAL EVERY EVENING
Qty: 30 TABLET | Refills: 0
Start: 2023-06-26

## 2023-06-26 ASSESSMENT — PATIENT HEALTH QUESTIONNAIRE - PHQ9
SUM OF ALL RESPONSES TO PHQ QUESTIONS 1-9: 0
1. LITTLE INTEREST OR PLEASURE IN DOING THINGS: 0
SUM OF ALL RESPONSES TO PHQ QUESTIONS 1-9: 0
2. FEELING DOWN, DEPRESSED OR HOPELESS: 0
SUM OF ALL RESPONSES TO PHQ QUESTIONS 1-9: 0
SUM OF ALL RESPONSES TO PHQ9 QUESTIONS 1 & 2: 0
SUM OF ALL RESPONSES TO PHQ QUESTIONS 1-9: 0

## 2023-06-29 ENCOUNTER — NURSE ONLY (OUTPATIENT)
Dept: FAMILY MEDICINE CLINIC | Age: 58
End: 2023-06-29
Payer: COMMERCIAL

## 2023-06-29 DIAGNOSIS — Z01.818 PRE-OP TESTING: Primary | ICD-10-CM

## 2023-06-29 PROCEDURE — 36415 COLL VENOUS BLD VENIPUNCTURE: CPT | Performed by: FAMILY MEDICINE

## 2023-06-30 LAB
ANION GAP SERPL CALC-SCNC: 9 MMOL/L (ref 5–15)
BASOPHILS # BLD: 0.1 K/UL (ref 0–0.1)
BASOPHILS NFR BLD: 2 % (ref 0–1)
BUN SERPL-MCNC: 19 MG/DL (ref 6–20)
BUN/CREAT SERPL: 16 (ref 12–20)
CALCIUM SERPL-MCNC: 9 MG/DL (ref 8.5–10.1)
CHLORIDE SERPL-SCNC: 108 MMOL/L (ref 97–108)
CO2 SERPL-SCNC: 21 MMOL/L (ref 21–32)
CREAT SERPL-MCNC: 1.17 MG/DL (ref 0.7–1.3)
DIFFERENTIAL METHOD BLD: ABNORMAL
EOSINOPHIL # BLD: 0.1 K/UL (ref 0–0.4)
EOSINOPHIL NFR BLD: 2 % (ref 0–7)
ERYTHROCYTE [DISTWIDTH] IN BLOOD BY AUTOMATED COUNT: 11.6 % (ref 11.5–14.5)
EST. AVERAGE GLUCOSE BLD GHB EST-MCNC: 85 MG/DL
GLUCOSE SERPL-MCNC: 106 MG/DL (ref 65–100)
HBA1C MFR BLD: 4.6 % (ref 4–5.6)
HCT VFR BLD AUTO: 46.3 % (ref 36.6–50.3)
HGB BLD-MCNC: 15.1 G/DL (ref 12.1–17)
IMM GRANULOCYTES # BLD AUTO: 0 K/UL (ref 0–0.04)
IMM GRANULOCYTES NFR BLD AUTO: 0 % (ref 0–0.5)
LYMPHOCYTES # BLD: 0.9 K/UL (ref 0.8–3.5)
LYMPHOCYTES NFR BLD: 22 % (ref 12–49)
MCH RBC QN AUTO: 34.5 PG (ref 26–34)
MCHC RBC AUTO-ENTMCNC: 32.6 G/DL (ref 30–36.5)
MCV RBC AUTO: 105.7 FL (ref 80–99)
MONOCYTES # BLD: 0.8 K/UL (ref 0–1)
MONOCYTES NFR BLD: 20 % (ref 5–13)
NEUTS SEG # BLD: 2.1 K/UL (ref 1.8–8)
NEUTS SEG NFR BLD: 54 % (ref 32–75)
NRBC # BLD: 0 K/UL (ref 0–0.01)
NRBC BLD-RTO: 0 PER 100 WBC
PLATELET # BLD AUTO: 222 K/UL (ref 150–400)
PMV BLD AUTO: 11 FL (ref 8.9–12.9)
POTASSIUM SERPL-SCNC: 4.3 MMOL/L (ref 3.5–5.1)
RBC # BLD AUTO: 4.38 M/UL (ref 4.1–5.7)
RBC MORPH BLD: ABNORMAL
SODIUM SERPL-SCNC: 138 MMOL/L (ref 136–145)
WBC # BLD AUTO: 4 K/UL (ref 4.1–11.1)

## 2023-11-29 ENCOUNTER — TELEPHONE (OUTPATIENT)
Dept: FAMILY MEDICINE CLINIC | Age: 58
End: 2023-11-29

## 2023-11-29 NOTE — TELEPHONE ENCOUNTER
----- Message from Joanna Velazquez sent at 11/29/2023  5:02 PM EST -----  Subject: Refill Request    QUESTIONS  Name of Medication? Other - Albuterol 90mcg HFA  Patient-reported dosage and instructions? 90 mcg HFA  How many days do you have left? 0  Preferred Pharmacy? [facility] phone number (if available)? [phone]  Additional Information for Provider? Fax number [phone], Patient   would like a 90 day supply with up to 3 refills  ---------------------------------------------------------------------------  --------------  CALL BACK INFO  What is the best way for the office to contact you? Do not leave any   message, patient will call back for answer  Preferred Call Back Phone Number? [phone]  ---------------------------------------------------------------------------  --------------  SCRIPT ANSWERS  Relationship to Patient? Covered Entity  Covered Entity Type? Pharmacy? Representative Name?  Autumn cA

## 2023-11-30 RX ORDER — ALBUTEROL SULFATE 90 UG/1
2 AEROSOL, METERED RESPIRATORY (INHALATION) EVERY 4 HOURS PRN
Qty: 18 G | Refills: 2 | Status: SHIPPED | OUTPATIENT
Start: 2023-11-30

## 2023-11-30 RX ORDER — ALBUTEROL SULFATE 90 UG/1
2 AEROSOL, METERED RESPIRATORY (INHALATION) EVERY 6 HOURS PRN
COMMUNITY
End: 2023-11-30 | Stop reason: SDUPTHER

## 2023-11-30 NOTE — TELEPHONE ENCOUNTER
Requested Prescriptions     Pending Prescriptions Disp Refills    albuterol sulfate HFA (PROVENTIL;VENTOLIN;PROAIR) 108 (90 Base) MCG/ACT inhaler 18 g 0     Sig: Inhale 2 puffs into the lungs every 6 hours as needed for Shortness of Breath or Wheezing     Last seen:  6/26/23

## 2023-11-30 NOTE — TELEPHONE ENCOUNTER
----- Message from Bryce Becker sent at 11/29/2023  5:02 PM EST -----  Subject: Refill Request    QUESTIONS  Name of Medication? Other - Albuterol 90mcg HFA  Patient-reported dosage and instructions? 90 mcg HFA  How many days do you have left? 0  Preferred Pharmacy? [facility] phone number (if available)? [phone]  Additional Information for Provider? Fax number [phone], Patient   would like a 90 day supply with up to 3 refills  ---------------------------------------------------------------------------  --------------  CALL BACK INFO  What is the best way for the office to contact you? Do not leave any   message, patient will call back for answer  Preferred Call Back Phone Number? [phone]  ---------------------------------------------------------------------------  --------------  SCRIPT ANSWERS  Relationship to Patient? Covered Entity  Covered Entity Type? Pharmacy? Representative Name?  Coreen Grubbs

## 2023-12-13 RX ORDER — LISINOPRIL 10 MG/1
10 TABLET ORAL EVERY EVENING
Qty: 90 TABLET | Refills: 1 | Status: SHIPPED | OUTPATIENT
Start: 2023-12-13

## 2023-12-19 ENCOUNTER — TELEPHONE (OUTPATIENT)
Dept: FAMILY MEDICINE CLINIC | Age: 58
End: 2023-12-19

## 2024-02-01 DIAGNOSIS — K21.9 GASTROESOPHAGEAL REFLUX DISEASE WITHOUT ESOPHAGITIS: ICD-10-CM

## 2024-02-01 RX ORDER — OMEPRAZOLE 20 MG/1
20 CAPSULE, DELAYED RELEASE ORAL 2 TIMES DAILY
Qty: 180 CAPSULE | Refills: 1 | Status: SHIPPED | OUTPATIENT
Start: 2024-02-01

## 2024-02-15 RX ORDER — TRAZODONE HYDROCHLORIDE 150 MG/1
150 TABLET ORAL NIGHTLY
Qty: 90 TABLET | Refills: 3 | Status: SHIPPED | OUTPATIENT
Start: 2024-02-15

## 2024-02-15 NOTE — TELEPHONE ENCOUNTER
Requested Prescriptions     Pending Prescriptions Disp Refills    traZODone (DESYREL) 150 MG tablet [Pharmacy Med Name: TRAZODONE HCL TABS 150MG] 90 tablet 3     Sig: TAKE 1 TABLET NIGHTLY     Last seen:  12/18/23

## 2024-06-07 RX ORDER — LISINOPRIL 10 MG/1
10 TABLET ORAL EVERY EVENING
Qty: 90 TABLET | Refills: 0 | Status: SHIPPED | OUTPATIENT
Start: 2024-06-07

## 2024-06-07 RX ORDER — ALBUTEROL SULFATE 90 UG/1
AEROSOL, METERED RESPIRATORY (INHALATION)
Qty: 17 G | Refills: 0 | Status: SHIPPED | OUTPATIENT
Start: 2024-06-07

## 2024-06-07 NOTE — TELEPHONE ENCOUNTER
Requested Prescriptions     Pending Prescriptions Disp Refills    lisinopril (PRINIVIL;ZESTRIL) 10 MG tablet [Pharmacy Med Name: LISINOPRIL TABS 10MG] 90 tablet 3     Sig: TAKE 1 TABLET EVERY EVENING    albuterol sulfate HFA (PROVENTIL;VENTOLIN;PROAIR) 108 (90 Base) MCG/ACT inhaler [Pharmacy Med Name: ALBUTEROL HFA INHALER 8.5GM 90MCG] 17 g 4     Sig: USE 2 INHALATIONS EVERY 4 HOURS AS NEEDED FOR WHEEZING OR SHORTNESS OF BREATH     Last seen:  12/18/23

## 2024-07-31 RX ORDER — ALBUTEROL SULFATE 90 UG/1
AEROSOL, METERED RESPIRATORY (INHALATION)
Qty: 17 G | Refills: 4 | Status: SHIPPED | OUTPATIENT
Start: 2024-07-31

## 2024-07-31 NOTE — TELEPHONE ENCOUNTER
Requested Prescriptions     Pending Prescriptions Disp Refills    albuterol sulfate HFA (PROVENTIL;VENTOLIN;PROAIR) 108 (90 Base) MCG/ACT inhaler [Pharmacy Med Name: ALBUTEROL HFA INHALER 8.5GM 90MCG] 17 g 4     Sig: USE 2 INHALATIONS EVERY 4 HOURS AS NEEDED FOR WHEEZING OR SHORTNESS OF BREATH     Last seen:  12/18/23

## 2024-09-04 RX ORDER — LISINOPRIL 10 MG/1
10 TABLET ORAL EVERY EVENING
Qty: 45 TABLET | Refills: 0 | Status: SHIPPED | OUTPATIENT
Start: 2024-09-04

## 2024-10-27 DIAGNOSIS — K21.9 GASTROESOPHAGEAL REFLUX DISEASE WITHOUT ESOPHAGITIS: ICD-10-CM

## 2024-10-28 RX ORDER — LISINOPRIL 10 MG/1
10 TABLET ORAL EVERY EVENING
Qty: 30 TABLET | Refills: 0 | Status: SHIPPED | OUTPATIENT
Start: 2024-10-28

## 2024-10-28 NOTE — TELEPHONE ENCOUNTER
Patient requesting refill on     Requested Prescriptions     Pending Prescriptions Disp Refills    lisinopril (PRINIVIL;ZESTRIL) 10 MG tablet [Pharmacy Med Name: LISINOPRIL TABS 10MG] 45 tablet 7     Sig: TAKE 1 TABLET EVERY EVENING    omeprazole (PRILOSEC) 20 MG delayed release capsule [Pharmacy Med Name: OMEPRAZOLE DR CAPS 20MG] 90 capsule 7     Sig: TAKE 1 CAPSULE TWICE A DAY        Last OV 12/18/2023

## 2024-12-04 ENCOUNTER — OFFICE VISIT (OUTPATIENT)
Dept: FAMILY MEDICINE CLINIC | Age: 59
End: 2024-12-04
Payer: COMMERCIAL

## 2024-12-04 VITALS
WEIGHT: 214 LBS | HEIGHT: 68 IN | DIASTOLIC BLOOD PRESSURE: 81 MMHG | SYSTOLIC BLOOD PRESSURE: 131 MMHG | RESPIRATION RATE: 16 BRPM | TEMPERATURE: 97.2 F | OXYGEN SATURATION: 95 % | HEART RATE: 105 BPM | BODY MASS INDEX: 32.43 KG/M2

## 2024-12-04 DIAGNOSIS — M25.552 LEFT HIP PAIN: Primary | ICD-10-CM

## 2024-12-04 DIAGNOSIS — N17.9 ACUTE KIDNEY INJURY (HCC): ICD-10-CM

## 2024-12-04 PROCEDURE — 3079F DIAST BP 80-89 MM HG: CPT | Performed by: FAMILY MEDICINE

## 2024-12-04 PROCEDURE — 99214 OFFICE O/P EST MOD 30 MIN: CPT | Performed by: FAMILY MEDICINE

## 2024-12-04 PROCEDURE — 3075F SYST BP GE 130 - 139MM HG: CPT | Performed by: FAMILY MEDICINE

## 2024-12-04 RX ORDER — TIZANIDINE 2 MG/1
2 TABLET ORAL EVERY 8 HOURS PRN
Qty: 30 TABLET | Refills: 0 | Status: SHIPPED | OUTPATIENT
Start: 2024-12-04

## 2024-12-04 RX ORDER — TRAMADOL HYDROCHLORIDE 50 MG/1
50 TABLET ORAL EVERY 8 HOURS PRN
Qty: 15 TABLET | Refills: 0 | Status: SHIPPED | OUTPATIENT
Start: 2024-12-04 | End: 2024-12-09

## 2024-12-04 SDOH — ECONOMIC STABILITY: FOOD INSECURITY: WITHIN THE PAST 12 MONTHS, YOU WORRIED THAT YOUR FOOD WOULD RUN OUT BEFORE YOU GOT MONEY TO BUY MORE.: NEVER TRUE

## 2024-12-04 SDOH — ECONOMIC STABILITY: FOOD INSECURITY: WITHIN THE PAST 12 MONTHS, THE FOOD YOU BOUGHT JUST DIDN'T LAST AND YOU DIDN'T HAVE MONEY TO GET MORE.: NEVER TRUE

## 2024-12-04 SDOH — ECONOMIC STABILITY: INCOME INSECURITY: HOW HARD IS IT FOR YOU TO PAY FOR THE VERY BASICS LIKE FOOD, HOUSING, MEDICAL CARE, AND HEATING?: NOT VERY HARD

## 2024-12-04 ASSESSMENT — PATIENT HEALTH QUESTIONNAIRE - PHQ9
2. FEELING DOWN, DEPRESSED OR HOPELESS: NOT AT ALL
SUM OF ALL RESPONSES TO PHQ QUESTIONS 1-9: 0
1. LITTLE INTEREST OR PLEASURE IN DOING THINGS: NOT AT ALL
SUM OF ALL RESPONSES TO PHQ9 QUESTIONS 1 & 2: 0

## 2024-12-04 NOTE — PROGRESS NOTES
(DESYREL) 150 MG tablet TAKE 1 TABLET NIGHTLY 90 tablet 3    leflunomide (ARAVA) 20 MG tablet Take 1 tablet by mouth daily      medical marijuana Take 1 each by mouth daily as needed.      DULoxetine (CYMBALTA) 60 MG extended release capsule Take 1 capsule by mouth every evening      tocilizumab (ACTEMRA ACTPEN) 162 MG/0.9ML SOAJ injection ceived the following from [facility]: Outside name: Actemra ACTPen 162 mg/0.9 mL injection      leflunomide (ARAVA) 10 MG tablet Take 1 tablet by mouth every evening 30 tablet 0    loperamide (IMODIUM A-D) 2 MG tablet Take 1 tablet by mouth as needed       No current facility-administered medications for this visit.             Physical Examination:    /81 (Site: Left Upper Arm, Position: Sitting, Cuff Size: Medium Adult)   Pulse (!) 105   Temp 97.2 °F (36.2 °C) (Oral)   Resp 16   Ht 1.727 m (5' 8\")   Wt 97.1 kg (214 lb)   SpO2 95%   BMI 32.54 kg/m²    General:  Alert, cooperative, no distress.   HEENT:  Normocephalic, without obvious abnormality, atraumatic.Conjunctivae/corneas clear. Pupils equal, round, reactive to light. Extraocular movements intact.TMs and external canals normal bilaterally. Nasal mucosa and oropharynx clear.   Lungs: Clear to auscultation bilaterally.   Chest wall:  No tenderness or deformity.   Heart:  Regular rate and rhythm, S1, S2 normal, no murmur, click, rub, or gallop.   Abdomen:   Soft, non-tender. Bowel sounds normal. No masses. No organomegaly.   Extremities: Extremities normal, atraumatic, no cyanosis or edema. Pain localizes to upper lumbar musculature on L. Pain with hip internal rotation   Pulses: 2+ and symmetric all extremities.   Skin: Skin color, texture, turgor normal. No rashes or lesions.   Lymph nodes: Cervical, supraclavicular, and axillary nodes normal.   Neurologic: CNII-XII intact. Normal strength, sensation, and reflexes throughout.         ASSESSMENT AND PLAN    1. Left hip pain    - XR LUMBAR SPINE

## 2024-12-05 ENCOUNTER — HOSPITAL ENCOUNTER (OUTPATIENT)
Facility: HOSPITAL | Age: 59
Discharge: HOME | End: 2024-12-08
Payer: COMMERCIAL

## 2024-12-05 DIAGNOSIS — M25.552 LEFT HIP PAIN: ICD-10-CM

## 2024-12-05 PROCEDURE — 73502 X-RAY EXAM HIP UNI 2-3 VIEWS: CPT

## 2024-12-05 PROCEDURE — 72110 X-RAY EXAM L-2 SPINE 4/>VWS: CPT

## 2024-12-12 ENCOUNTER — LAB (OUTPATIENT)
Dept: FAMILY MEDICINE CLINIC | Age: 59
End: 2024-12-12
Payer: COMMERCIAL

## 2024-12-12 DIAGNOSIS — N17.9 ACUTE KIDNEY INJURY (HCC): ICD-10-CM

## 2024-12-12 LAB
BILIRUBIN, URINE, POC: NEGATIVE
BLOOD URINE, POC: NEGATIVE
GLUCOSE URINE, POC: NEGATIVE
KETONES, URINE, POC: NORMAL
LEUKOCYTE ESTERASE, URINE, POC: NEGATIVE
NITRITE, URINE, POC: NEGATIVE
PH, URINE, POC: 6.5 (ref 4.6–8)
PROTEIN,URINE, POC: NEGATIVE
SPECIFIC GRAVITY, URINE, POC: 1.02 (ref 1–1.03)
URINALYSIS CLARITY, POC: CLEAR
URINALYSIS COLOR, POC: YELLOW
UROBILINOGEN, POC: NORMAL MG/DL

## 2024-12-12 PROCEDURE — 36415 COLL VENOUS BLD VENIPUNCTURE: CPT | Performed by: FAMILY MEDICINE

## 2024-12-12 PROCEDURE — 81002 URINALYSIS NONAUTO W/O SCOPE: CPT | Performed by: FAMILY MEDICINE

## 2024-12-12 NOTE — PROGRESS NOTES
Successful venipuncture in patient's Left AC X 1 sticks.  The patient tolerated this procedure w/o c/o pain or discomfort.    Patient presents for lab draw ordered by:    Ordering Provider:  Dr Marbin Valentino      The following labs were drawn and sent to lab.    BMP    The following tubes were sent:    Gold  ( 1)    Patient tolerated well.

## 2024-12-13 LAB
ANION GAP SERPL CALC-SCNC: 7 MMOL/L (ref 2–12)
BUN SERPL-MCNC: 17 MG/DL (ref 6–20)
BUN/CREAT SERPL: 13 (ref 12–20)
CALCIUM SERPL-MCNC: 8 MG/DL (ref 8.5–10.1)
CHLORIDE SERPL-SCNC: 108 MMOL/L (ref 97–108)
CO2 SERPL-SCNC: 25 MMOL/L (ref 21–32)
CREAT SERPL-MCNC: 1.26 MG/DL (ref 0.7–1.3)
GLUCOSE SERPL-MCNC: 111 MG/DL (ref 65–100)
POTASSIUM SERPL-SCNC: 4 MMOL/L (ref 3.5–5.1)
SODIUM SERPL-SCNC: 140 MMOL/L (ref 136–145)

## 2025-02-03 RX ORDER — LISINOPRIL 10 MG/1
10 TABLET ORAL EVERY EVENING
Qty: 90 TABLET | Refills: 0 | Status: SHIPPED | OUTPATIENT
Start: 2025-02-03

## 2025-02-03 NOTE — TELEPHONE ENCOUNTER
Patient requesting refill on     Requested Prescriptions     Pending Prescriptions Disp Refills    lisinopril (PRINIVIL;ZESTRIL) 10 MG tablet [Pharmacy Med Name: LISINOPRIL TABS 10MG] 30 tablet 11     Sig: TAKE 1 TABLET EVERY EVENING        Last OV 12/4/2024

## 2025-02-10 RX ORDER — TRAZODONE HYDROCHLORIDE 150 MG/1
150 TABLET ORAL NIGHTLY
Qty: 90 TABLET | Refills: 3 | Status: SHIPPED | OUTPATIENT
Start: 2025-02-10

## 2025-02-10 NOTE — TELEPHONE ENCOUNTER
Patient requesting refill on     Requested Prescriptions     Pending Prescriptions Disp Refills    traZODone (DESYREL) 150 MG tablet [Pharmacy Med Name: TRAZODONE HCL TABS 150MG] 90 tablet 3     Sig: TAKE 1 TABLET NIGHTLY        Last OV 12/4/2024

## 2025-04-30 RX ORDER — LISINOPRIL 10 MG/1
10 TABLET ORAL EVERY EVENING
Qty: 90 TABLET | Refills: 0 | Status: SHIPPED | OUTPATIENT
Start: 2025-04-30

## 2025-07-14 RX ORDER — LISINOPRIL 10 MG/1
10 TABLET ORAL EVERY EVENING
Qty: 40 TABLET | Refills: 0 | Status: SHIPPED | OUTPATIENT
Start: 2025-07-14

## 2025-07-14 NOTE — TELEPHONE ENCOUNTER
Requested Prescriptions     Pending Prescriptions Disp Refills    lisinopril (PRINIVIL;ZESTRIL) 10 MG tablet [Pharmacy Med Name: LISINOPRIL TABS 10MG] 90 tablet 3     Sig: TAKE 1 TABLET EVERY EVENING       LOV 12/4/25 (Acute Visit)   Last labs 12/12/24  Last refill 4/30/25 #90 with 0 RF

## 2025-07-23 ENCOUNTER — OFFICE VISIT (OUTPATIENT)
Dept: FAMILY MEDICINE CLINIC | Age: 60
End: 2025-07-23
Payer: COMMERCIAL

## 2025-07-23 VITALS
DIASTOLIC BLOOD PRESSURE: 76 MMHG | OXYGEN SATURATION: 95 % | HEART RATE: 75 BPM | TEMPERATURE: 96.9 F | BODY MASS INDEX: 31.67 KG/M2 | WEIGHT: 209 LBS | HEIGHT: 68 IN | RESPIRATION RATE: 16 BRPM | SYSTOLIC BLOOD PRESSURE: 132 MMHG

## 2025-07-23 DIAGNOSIS — Z23 ENCOUNTER FOR IMMUNIZATION: ICD-10-CM

## 2025-07-23 DIAGNOSIS — F33.41 RECURRENT MAJOR DEPRESSIVE DISORDER, IN PARTIAL REMISSION: ICD-10-CM

## 2025-07-23 DIAGNOSIS — I10 ESSENTIAL HYPERTENSION: Primary | ICD-10-CM

## 2025-07-23 PROCEDURE — 90472 IMMUNIZATION ADMIN EACH ADD: CPT | Performed by: FAMILY MEDICINE

## 2025-07-23 PROCEDURE — 90471 IMMUNIZATION ADMIN: CPT | Performed by: FAMILY MEDICINE

## 2025-07-23 PROCEDURE — 99214 OFFICE O/P EST MOD 30 MIN: CPT | Performed by: FAMILY MEDICINE

## 2025-07-23 PROCEDURE — 90750 HZV VACC RECOMBINANT IM: CPT | Performed by: FAMILY MEDICINE

## 2025-07-23 PROCEDURE — 3075F SYST BP GE 130 - 139MM HG: CPT | Performed by: FAMILY MEDICINE

## 2025-07-23 PROCEDURE — 90677 PCV20 VACCINE IM: CPT | Performed by: FAMILY MEDICINE

## 2025-07-23 PROCEDURE — 3078F DIAST BP <80 MM HG: CPT | Performed by: FAMILY MEDICINE

## 2025-07-23 RX ORDER — TRAZODONE HYDROCHLORIDE 150 MG/1
150 TABLET ORAL NIGHTLY
Qty: 90 TABLET | Refills: 3 | Status: SHIPPED | OUTPATIENT
Start: 2025-07-23

## 2025-07-23 RX ORDER — LISINOPRIL 10 MG/1
10 TABLET ORAL EVERY EVENING
Qty: 90 TABLET | Refills: 3 | Status: SHIPPED | OUTPATIENT
Start: 2025-07-23

## 2025-07-23 SDOH — ECONOMIC STABILITY: FOOD INSECURITY: WITHIN THE PAST 12 MONTHS, THE FOOD YOU BOUGHT JUST DIDN'T LAST AND YOU DIDN'T HAVE MONEY TO GET MORE.: NEVER TRUE

## 2025-07-23 SDOH — ECONOMIC STABILITY: FOOD INSECURITY: WITHIN THE PAST 12 MONTHS, YOU WORRIED THAT YOUR FOOD WOULD RUN OUT BEFORE YOU GOT MONEY TO BUY MORE.: NEVER TRUE

## 2025-07-23 ASSESSMENT — PATIENT HEALTH QUESTIONNAIRE - PHQ9
SUM OF ALL RESPONSES TO PHQ QUESTIONS 1-9: 0
1. LITTLE INTEREST OR PLEASURE IN DOING THINGS: NOT AT ALL
SUM OF ALL RESPONSES TO PHQ QUESTIONS 1-9: 0
2. FEELING DOWN, DEPRESSED OR HOPELESS: NOT AT ALL

## 2025-07-23 NOTE — PROGRESS NOTES
Have you been to the ER, urgent care clinic since your last visit?  Hospitalized since your last visit?   Yes - 6/26/25 - [facility] - [facility]    Have you seen or consulted any other health care providers outside our system since your last visit?   NO    7/23/2025      Chief Complaint   Patient presents with    Hypertension         History of Present Illness:         is a 60 y.o. male to follow up HTN. Sees oncology next month, joint pain is well controlled on biologics. Had LRI in June, has recovered. Mood is good. Duloxetine prescribed by rheum.      Allergies   Allergen Reactions    Penicillins Hives and Rash     NO HOSPITALIZATION NEEDED  Other reaction(s): hives  Other reaction(s): Hives, redness  Other reaction(s): Hives, redness    Abatacept Other (See Comments)     Flu like symptoms  Flu like symptoms    Doxycycline Nausea And Vomiting and Other (See Comments)     Other reaction(s): Unknown  Other reaction(s): Unknown       Current Outpatient Medications   Medication Sig Dispense Refill    lisinopril (PRINIVIL;ZESTRIL) 10 MG tablet Take 1 tablet by mouth every evening 90 tablet 3    traZODone (DESYREL) 150 MG tablet Take 1 tablet by mouth nightly 90 tablet 3    tiZANidine (ZANAFLEX) 2 MG tablet Take 1 tablet by mouth every 8 hours as needed (back pain) 30 tablet 0    omeprazole (PRILOSEC) 20 MG delayed release capsule TAKE 1 CAPSULE TWICE A DAY 30 capsule 0    albuterol sulfate HFA (PROVENTIL;VENTOLIN;PROAIR) 108 (90 Base) MCG/ACT inhaler USE 2 INHALATIONS EVERY 4 HOURS AS NEEDED FOR WHEEZING OR SHORTNESS OF BREATH 17 g 4    leflunomide (ARAVA) 20 MG tablet Take 1 tablet by mouth daily      medical marijuana Take 1 each by mouth daily as needed.      DULoxetine (CYMBALTA) 60 MG extended release capsule Take 1 capsule by mouth every evening      loperamide (IMODIUM A-D) 2 MG tablet Take 1 tablet by mouth as needed      tocilizumab (ACTEMRA ACTPEN) 162 MG/0.9ML SOAJ injection ceived the following from [facility]

## 2025-07-24 LAB
ANION GAP SERPL CALC-SCNC: 7 MMOL/L (ref 2–12)
BUN SERPL-MCNC: 12 MG/DL (ref 6–20)
BUN/CREAT SERPL: 10 (ref 12–20)
CALCIUM SERPL-MCNC: 9.2 MG/DL (ref 8.5–10.1)
CHLORIDE SERPL-SCNC: 109 MMOL/L (ref 97–108)
CO2 SERPL-SCNC: 25 MMOL/L (ref 21–32)
CREAT SERPL-MCNC: 1.15 MG/DL (ref 0.7–1.3)
GLUCOSE SERPL-MCNC: 97 MG/DL (ref 65–100)
POTASSIUM SERPL-SCNC: 4.4 MMOL/L (ref 3.5–5.1)
SODIUM SERPL-SCNC: 141 MMOL/L (ref 136–145)

## (undated) DEVICE — SYR LR LCK 1ML GRAD NSAF 30ML --

## (undated) DEVICE — GLOVE ORANGE PI 7 1/2   MSG9075

## (undated) DEVICE — SOLUTION IRRIG 3000ML 0.9% SOD CHL USP UROMATIC PLAS CONT

## (undated) DEVICE — HANDPIECE SET WITH BONE CLEANING TIP AND SUCTION TUBE: Brand: INTERPULSE

## (undated) DEVICE — HYPODERMIC SAFETY NEEDLE: Brand: MAGELLAN

## (undated) DEVICE — CONTAINER,SPECIMEN,3OZ,OR STRL: Brand: MEDLINE

## (undated) DEVICE — SUTURE VCRL SZ 2-0 L36IN ABSRB UD L40MM CT 1/2 CIR J957H

## (undated) DEVICE — GLOVE SURG SZ 75 L12IN FNGR THK79MIL GRN LTX FREE

## (undated) DEVICE — SUTURE STRATAFIX SYMMETRIC PDS + SZ 1 L18IN ABSRB VLT L48MM SXPP1A400

## (undated) DEVICE — BLADE SAW W083XL354IN THK0047IN CUT THK0047IN SAG FLR

## (undated) DEVICE — SCRUB DRY SURG EZ SCRUB BRUSH PREOPERATIVE GRN

## (undated) DEVICE — GLOVE ORTHO 7   MSG9470

## (undated) DEVICE — HEADLESS TROCHAR PIN 75MM: Brand: ZUK

## (undated) DEVICE — ZIMMER® STERILE DISPOSABLE TOURNIQUET CUFF WITH PLC, DUAL PORT, SINGLE BLADDER, 34 IN. (86 CM)

## (undated) DEVICE — PADDING CST 6IN STERILE --

## (undated) DEVICE — 4-PORT MANIFOLD: Brand: NEPTUNE 2

## (undated) DEVICE — DECANTER BAG 9": Brand: MEDLINE INDUSTRIES, INC.

## (undated) DEVICE — TOTAL JOINT - SMH: Brand: MEDLINE INDUSTRIES, INC.

## (undated) DEVICE — PREP SKN CHLRAPRP APL 26ML STR --

## (undated) DEVICE — TOTAL TRAY, 16FR 10ML SIL FOLEY, URN: Brand: MEDLINE

## (undated) DEVICE — CARTRIDGE BNE CEM MIX UNIV TWR VAC ROTOR BRK OFF NOZ W/O

## (undated) DEVICE — Device

## (undated) DEVICE — BANDAGE COMPR M W6INXL10YD WHT BGE VELC E MTRX HK AND LOOP

## (undated) DEVICE — GLOVE SURG SZ 65 L12IN FNGR THK94MIL STD WHT LTX FREE

## (undated) DEVICE — SYR 20ML LL STRL LF --

## (undated) DEVICE — SYR 10ML LUER LOK 1/5ML GRAD --

## (undated) DEVICE — YANKAUER,FLEXIBLE HANDLE,REGLR CAPACITY: Brand: MEDLINE INDUSTRIES, INC.

## (undated) DEVICE — T4 HOOD

## (undated) DEVICE — SUTURE VCRL SZ 2 L54IN ABSRB UD L65MM TP-1 1/2 CIR J880T

## (undated) DEVICE — SOLUTION IRRIG 1000ML STRL H2O USP PLAS POUR BTL